# Patient Record
Sex: FEMALE | Race: ASIAN | NOT HISPANIC OR LATINO | URBAN - METROPOLITAN AREA
[De-identification: names, ages, dates, MRNs, and addresses within clinical notes are randomized per-mention and may not be internally consistent; named-entity substitution may affect disease eponyms.]

---
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
## 2021-04-01 ENCOUNTER — INPATIENT (INPATIENT)
Facility: HOSPITAL | Age: 21
LOS: 27 days | Discharge: HOME | End: 2021-04-29
Attending: PSYCHIATRY & NEUROLOGY | Admitting: PSYCHIATRY & NEUROLOGY
Payer: COMMERCIAL

## 2021-04-01 ENCOUNTER — OUTPATIENT (OUTPATIENT)
Dept: OUTPATIENT SERVICES | Facility: HOSPITAL | Age: 21
LOS: 1 days | Discharge: STILL ADMITTED OR ADMINISTRATIVE | End: 2021-04-01
Payer: COMMERCIAL

## 2021-04-01 VITALS
DIASTOLIC BLOOD PRESSURE: 93 MMHG | HEART RATE: 110 BPM | RESPIRATION RATE: 16 BRPM | OXYGEN SATURATION: 100 % | SYSTOLIC BLOOD PRESSURE: 149 MMHG | TEMPERATURE: 98 F | HEIGHT: 65 IN

## 2021-04-01 DIAGNOSIS — F31.9 BIPOLAR DISORDER, UNSPECIFIED: ICD-10-CM

## 2021-04-01 LAB
ALBUMIN SERPL ELPH-MCNC: 4.7 G/DL — SIGNIFICANT CHANGE UP (ref 3.3–5)
ALP SERPL-CCNC: 58 U/L — SIGNIFICANT CHANGE UP (ref 40–120)
ALT FLD-CCNC: 11 U/L — SIGNIFICANT CHANGE UP (ref 4–33)
ANION GAP SERPL CALC-SCNC: 16 MMOL/L — HIGH (ref 7–14)
APPEARANCE UR: CLEAR — SIGNIFICANT CHANGE UP
AST SERPL-CCNC: 21 U/L — SIGNIFICANT CHANGE UP (ref 4–32)
BASOPHILS # BLD AUTO: 0.02 K/UL — SIGNIFICANT CHANGE UP (ref 0–0.2)
BASOPHILS NFR BLD AUTO: 0.2 % — SIGNIFICANT CHANGE UP (ref 0–2)
BILIRUB SERPL-MCNC: 0.9 MG/DL — SIGNIFICANT CHANGE UP (ref 0.2–1.2)
BILIRUB UR-MCNC: NEGATIVE — SIGNIFICANT CHANGE UP
BUN SERPL-MCNC: 8 MG/DL — SIGNIFICANT CHANGE UP (ref 7–23)
CALCIUM SERPL-MCNC: 9.9 MG/DL — SIGNIFICANT CHANGE UP (ref 8.4–10.5)
CHLORIDE SERPL-SCNC: 103 MMOL/L — SIGNIFICANT CHANGE UP (ref 98–107)
CO2 SERPL-SCNC: 18 MMOL/L — LOW (ref 22–31)
COLOR SPEC: COLORLESS — SIGNIFICANT CHANGE UP
COVID-19 SPIKE DOMAIN AB INTERP: NEGATIVE — SIGNIFICANT CHANGE UP
COVID-19 SPIKE DOMAIN ANTIBODY RESULT: 0.4 U/ML — SIGNIFICANT CHANGE UP
CREAT SERPL-MCNC: 0.65 MG/DL — SIGNIFICANT CHANGE UP (ref 0.5–1.3)
DIFF PNL FLD: NEGATIVE — SIGNIFICANT CHANGE UP
EOSINOPHIL # BLD AUTO: 0.01 K/UL — SIGNIFICANT CHANGE UP (ref 0–0.5)
EOSINOPHIL NFR BLD AUTO: 0.1 % — SIGNIFICANT CHANGE UP (ref 0–6)
GLUCOSE SERPL-MCNC: 121 MG/DL — HIGH (ref 70–99)
GLUCOSE UR QL: NEGATIVE — SIGNIFICANT CHANGE UP
HCG SERPL-ACNC: <5 MIU/ML — SIGNIFICANT CHANGE UP
HCT VFR BLD CALC: 39.7 % — SIGNIFICANT CHANGE UP (ref 34.5–45)
HGB BLD-MCNC: 13.3 G/DL — SIGNIFICANT CHANGE UP (ref 11.5–15.5)
IANC: 6.06 K/UL — SIGNIFICANT CHANGE UP (ref 1.5–8.5)
IMM GRANULOCYTES NFR BLD AUTO: 0.3 % — SIGNIFICANT CHANGE UP (ref 0–1.5)
KETONES UR-MCNC: ABNORMAL
LEUKOCYTE ESTERASE UR-ACNC: NEGATIVE — SIGNIFICANT CHANGE UP
LYMPHOCYTES # BLD AUTO: 2.26 K/UL — SIGNIFICANT CHANGE UP (ref 1–3.3)
LYMPHOCYTES # BLD AUTO: 24.5 % — SIGNIFICANT CHANGE UP (ref 13–44)
MCHC RBC-ENTMCNC: 28.6 PG — SIGNIFICANT CHANGE UP (ref 27–34)
MCHC RBC-ENTMCNC: 33.5 GM/DL — SIGNIFICANT CHANGE UP (ref 32–36)
MCV RBC AUTO: 85.4 FL — SIGNIFICANT CHANGE UP (ref 80–100)
MONOCYTES # BLD AUTO: 0.85 K/UL — SIGNIFICANT CHANGE UP (ref 0–0.9)
MONOCYTES NFR BLD AUTO: 9.2 % — SIGNIFICANT CHANGE UP (ref 2–14)
NEUTROPHILS # BLD AUTO: 6.06 K/UL — SIGNIFICANT CHANGE UP (ref 1.8–7.4)
NEUTROPHILS NFR BLD AUTO: 65.7 % — SIGNIFICANT CHANGE UP (ref 43–77)
NITRITE UR-MCNC: NEGATIVE — SIGNIFICANT CHANGE UP
NRBC # BLD: 0 /100 WBCS — SIGNIFICANT CHANGE UP
NRBC # FLD: 0 K/UL — SIGNIFICANT CHANGE UP
PCP SPEC-MCNC: SIGNIFICANT CHANGE UP
PH UR: 6.5 — SIGNIFICANT CHANGE UP (ref 5–8)
PLATELET # BLD AUTO: 367 K/UL — SIGNIFICANT CHANGE UP (ref 150–400)
POTASSIUM SERPL-MCNC: 3.8 MMOL/L — SIGNIFICANT CHANGE UP (ref 3.5–5.3)
POTASSIUM SERPL-SCNC: 3.8 MMOL/L — SIGNIFICANT CHANGE UP (ref 3.5–5.3)
PROT SERPL-MCNC: 7.7 G/DL — SIGNIFICANT CHANGE UP (ref 6–8.3)
PROT UR-MCNC: NEGATIVE — SIGNIFICANT CHANGE UP
RBC # BLD: 4.65 M/UL — SIGNIFICANT CHANGE UP (ref 3.8–5.2)
RBC # FLD: 12.6 % — SIGNIFICANT CHANGE UP (ref 10.3–14.5)
SARS-COV-2 IGG+IGM SERPL QL IA: 0.4 U/ML — SIGNIFICANT CHANGE UP
SARS-COV-2 IGG+IGM SERPL QL IA: NEGATIVE — SIGNIFICANT CHANGE UP
SARS-COV-2 RNA SPEC QL NAA+PROBE: SIGNIFICANT CHANGE UP
SODIUM SERPL-SCNC: 137 MMOL/L — SIGNIFICANT CHANGE UP (ref 135–145)
SP GR SPEC: 1.01 — LOW (ref 1.01–1.02)
TOXICOLOGY SCREEN, DRUGS OF ABUSE, SERUM RESULT: SIGNIFICANT CHANGE UP
TSH SERPL-MCNC: 1.94 UIU/ML — SIGNIFICANT CHANGE UP (ref 0.27–4.2)
UROBILINOGEN FLD QL: SIGNIFICANT CHANGE UP
WBC # BLD: 9.23 K/UL — SIGNIFICANT CHANGE UP (ref 3.8–10.5)
WBC # FLD AUTO: 9.23 K/UL — SIGNIFICANT CHANGE UP (ref 3.8–10.5)

## 2021-04-01 PROCEDURE — 90853 GROUP PSYCHOTHERAPY: CPT

## 2021-04-01 PROCEDURE — 90839 PSYTX CRISIS INITIAL 60 MIN: CPT

## 2021-04-01 PROCEDURE — 99285 EMERGENCY DEPT VISIT HI MDM: CPT

## 2021-04-01 RX ORDER — HALOPERIDOL DECANOATE 100 MG/ML
5 INJECTION INTRAMUSCULAR EVERY 6 HOURS
Refills: 0 | Status: DISCONTINUED | OUTPATIENT
Start: 2021-04-01 | End: 2021-04-29

## 2021-04-01 RX ORDER — LITHIUM CARBONATE 300 MG/1
600 TABLET, EXTENDED RELEASE ORAL AT BEDTIME
Refills: 0 | Status: DISCONTINUED | OUTPATIENT
Start: 2021-04-01 | End: 2021-04-29

## 2021-04-01 RX ORDER — ARIPIPRAZOLE 15 MG/1
5 TABLET ORAL DAILY
Refills: 0 | Status: DISCONTINUED | OUTPATIENT
Start: 2021-04-01 | End: 2021-04-05

## 2021-04-01 RX ORDER — HALOPERIDOL DECANOATE 100 MG/ML
5 INJECTION INTRAMUSCULAR ONCE
Refills: 0 | Status: COMPLETED | OUTPATIENT
Start: 2021-04-01 | End: 2021-04-01

## 2021-04-01 RX ADMIN — Medication 2 MILLIGRAM(S): at 22:42

## 2021-04-01 RX ADMIN — HALOPERIDOL DECANOATE 5 MILLIGRAM(S): 100 INJECTION INTRAMUSCULAR at 22:41

## 2021-04-01 NOTE — ED PROVIDER NOTE - OBJECTIVE STATEMENT
20yo F hx prior psychiatric diagnoses of adjustment disorder and MDD with psychotic features referred from crisis center for hallucination. PT endorse she "makes up friend" "not to feel lonely" Endorsing auditory and visual hallucination, starts to laugh and giggle when asked. Denies command hallucination. Denies SI or HI. Denies taking an meds. ROS unremarkable. Denies ETOH or drug use.

## 2021-04-01 NOTE — ED ADULT TRIAGE NOTE - CHIEF COMPLAINT QUOTE
pt pmh bipolar affective disorder states that she was praying today and was using the holy spirit as her vessel. Pt states she experiences visual and auditory hallucinations. Pt is unable to explain what the hallucinations are. Denies SI/HI/drug/etoh use.

## 2021-04-01 NOTE — ED BEHAVIORAL HEALTH ASSESSMENT NOTE - OTHER
"ecstatic" not assessed Disheveled haircut CVM parents unpredictable appeared as stated age, gaunt stereotypical behavior; no catatonia "heavenly"

## 2021-04-01 NOTE — ED ADULT NURSE REASSESSMENT NOTE - NS ED NURSE REASSESS COMMENT FT1
Pt increasingly psychotic/disorganized; has intermittent screaming; poured pitcher of water over head stating "she is taking a shower", ripped bed linens off bed and was rolling around on the floor. Pt reassessed by NP; refuses PO meds and requests "a shot"; pt medicated as ordered.

## 2021-04-01 NOTE — ED BEHAVIORAL HEALTH ASSESSMENT NOTE - NSSUICPROTFACT_PSY_ALL_CORE
Identifies reasons for living/Supportive social network of family or friends/Engaged in work or school/Oriental orthodox beliefs

## 2021-04-01 NOTE — ED BEHAVIORAL HEALTH ASSESSMENT NOTE - SUMMARY
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 The patient has persistently elevated mood, increased goal-directed activities, and a decreased need for sleep consistent with a manic episode. She also has psychotic features including auditory hallucinations, delusions of persecution, reference, and grandiosity and thought broadcasting. She displays flight of ideas. The patient's pathology warrants inpatient hospitalization for safety and stabilization. 21/F with hx of Bipolar disorder, 2 prior [facility] admissions (last admission back in 8/2019 for management of acute anson), no prior hx of SA or self injurious behaviors and no substance abuse hx.  Today, presented to the [facility] parents due to worsening manic symptoms presenting as insomnia for the past 6 days; there is associated persistently elevated mood, lability, grandiosity, increased energy level, consistent with a manic episode.  Along with the anson, there is also has psychotic features including auditory hallucinations, delusions of persecution/ referential. 21/F with hx of Bipolar disorder, 2 prior [facility] admissions (last admission back in 8/2019 for management of acute anson), no prior hx of SA or self injurious behaviors and no substance abuse hx.  Today, presented to the [facility] parents due to worsening manic symptoms presenting as insomnia for the past 6 days; there is associated persistently elevated mood, lability, grandiosity, increased energy level, consistent with a manic episode.  Along with the anson, there is also has psychotic features including auditory hallucinations, delusions of persecution/ referential.    Overall, at this time, the Pt presents with disorganization in TP (FOI, illogical/ impaired reasoning), is severely affectively dysregulated, remains unpredictable, delusional, has poor insight and impaired judgement.  Pt's current symptoms have caused severe functional impairment.  She is unable to partake towards safety planning.  Collateral information was obtained from the parents who reported that the Pt has been increasingly manic for the last 1 week.  Pt has not been taking her psych meds.  Given Pt's current presentation, will need in-Pt psych admission for stabilization of mood/ psychosis    RECOMMENDATIONS:   1. REINITIATE Abilify 5mg daily as primary antipsychotic/ adjunct mood stabilizer and Lithium 600mg HS as primary mood stabilizer.  Primary treatment to titrate accordingly   2. PRN: Haldol 5mg PO/IM + Ativan 2mg PO/IM q6hrs PRN for psychotic agitation  3. do Lithium level in 5 days. adjust as appropriate   4. facilitate transfer to [facility] on 9.39 status once medically cleared

## 2021-04-01 NOTE — ED PROVIDER NOTE - PROGRESS NOTE DETAILS
GEORGE Hodgson NP: Patient endorsed from previous ED provider. Patient is calm, cooperative.  Labs reviewed, patient not in distress, nontoxic appearing and medically stable for inpatient psychiatric admission per the recommendation of ED psychiatry. Patient awaiting transport to [facility].  Patient increasingly disorganized, pouring water on herself, removing mattress from bed and screaming in room.  Patient offered PO medication but refused.  IM medication provided.

## 2021-04-01 NOTE — ED BEHAVIORAL HEALTH ASSESSMENT NOTE - RISK ASSESSMENT
Elevated risk of harm due to currently being manic. This risk will be mitigated by inpatient treatment. Protective factors are no current SI/HI. See  note for [place] suicide screen and other risk factors. Moderate Acute Suicide Risk RISK ASSESSMENT:   Modifiable risk factors: anson + psychosis  Unmodifiable risk factors: hx of bipolar disorder, Pt is doing poorly, past hx of psych hosps, treatment noncompliance  Protective factors: no hx of SA or any self injurious behaviors, Domiciled and in college,   good support from family, no access to lethal means, no objective evidence of suicidality, no complex medical issues or chronic pains, no hx of violence or any pending legal cases, not intoxicated or in withdrawal, no family hx of SA    At this time given all risks taken into consideration, the Pt is at acute suicide risk (given mood lability + psychosis) as well as remaining at chronically elevated risk of harming self.  Pt is deemed NOT dischargeable back to the community given this current presentation.  Current increased in risks can be mitigated by a psychiatric admission with supervision, continuing psych meds with titration towards efficacious dosing range

## 2021-04-01 NOTE — ED BEHAVIORAL HEALTH ASSESSMENT NOTE - DESCRIPTION
Single, no kids, will soon start college at [employer] Denies HTN, HLD, DM, or other illnesses NONE Single, only child. biological father living in [place]. Pt born in [place] and reports she migrated to [place] at age 9.  Mother remarried and current, Pt lives with mother and step fathger.  Step father is reportedly a Professor in Biology teaching at [employer]; mother is a soft ware .  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  She reports being interested in Languages.  Likes dancing, drawing, composing music.  she is not Episcopalian Since her  ED arrival, she was noted to be labile yet redirectable.  There has been no occurrence of agitation/aggressive behavior.  No verbalization of active/ passive SI/HI.   She is not showing any signs/symptoms of intoxication or withdrawal.  Pt is not delirious.  Has not tested limits.. Has maintained appropriate boundaries. Pt has been easily redirected.  Overall, there has been no management issues.     Vital Signs Last 24 Hrs  T(C): 36.7 (01 Apr 2021 10:48), Max: 36.7 (01 Apr 2021 10:48)  T(F): 98.1 (01 Apr 2021 10:48), Max: 98.1 (01 Apr 2021 10:48)  HR: 110 (01 Apr 2021 10:48) (110 - 110)  BP: 149/93 (01 Apr 2021 10:48) (149/93 - 149/93)  BP(mean): --  RR: 16 (01 Apr 2021 10:48) (16 - 16)  SpO2: 100% (01 Apr 2021 10:48) (100% - 100%)

## 2021-04-01 NOTE — ED BEHAVIORAL HEALTH ASSESSMENT NOTE - MEDICAL ISSUES AND PLAN (INCLUDE STANDING AND PRN MEDICATION)
No medical issues - Consider contraception given that the patient is being started on a mood stabilizer No medical issues

## 2021-04-01 NOTE — ED PROVIDER NOTE - PHYSICAL EXAMINATION
pt calm, cooperative in ED. smiling and giggling  to herself.   mmm. non icteric  normal S1-S2 slight tachycardia  talking in full sentences. poor inspiratory effort. no retractions.  thin female nontender abdomen.   ambulatory without assistance.   no pedal edema. no calf tenderness. normal pulses bilateral feet.  no lac of hands/arms

## 2021-04-01 NOTE — ED BEHAVIORAL HEALTH ASSESSMENT NOTE - PSYCHIATRIC ISSUES AND PLAN (INCLUDE STANDING AND PRN MEDICATION)
Would recommend beginning aripiprazole 5 mg daily, lithium 300 mg BID, and Klonopin 1 mg qhs. Can use Haldol 2.5 mg, Ativan 1 mg, benadryl 25 mg PO/IM q6h PRN for agitation. Would recommend restarting her back on Abilify 5mg daily and Lithium 600mg HS.  PRN: Haldol 5mg PO/IM + Ativan 2mg PO/IM q6hrs PRN for psychotic agitation

## 2021-04-01 NOTE — ED BEHAVIORAL HEALTH ASSESSMENT NOTE - HPI (INCLUDE ILLNESS QUALITY, SEVERITY, DURATION, TIMING, CONTEXT, MODIFYING FACTORS, ASSOCIATED SIGNS AND SYMPTOMS)
The Pt is 21 yr old  female, single, domiciled and full time college student (currently on remote module).  Has past hx of bipolar disorder, 2 prior [facility] admissions, hx of noncompliance to meds, no community psychiatrist but has an campus counsellor; no hx of SA or self injurious behaviors and no substance abuse hx.  Today, self presented to the ED BIB parents upon referral from our [facility] as Pt earlier presented to the [facility] for evaluation of anson.      She is seen bedside.  Pt is labile but redirectable.  Claims that she has been experiencing a hard time with her online classes.  She reports being "forced to learn".  Noted to intermittently cry then laugh.  Began to cry when she told about how her parents were making her feel bad with regards to assuming all school expenses (including parents paying up for her tuition fees).  Says that she was told by parents how they are "suffering for her".  Pt tells writer that with these statements made by her parents, she feels that "something is off".  Pt reports that parents are constantly lying to her about everything.  She believes that mother is "worshipping money over god".  Pt then switched topic and was noted to laugh as she says that today is April fools day.  She claims to see "birds laughing at us (her including this writer)."  She reports that the birds are communicating with her.. That they (the birds) are happy and excited.  She then made odd gestures during this evaluation,  expressing that she sends messages to the birds.   Apart from getting messages from birds, she also admits hearing the voice of god.  She claims to be getting Evangelical messages from god.  Pt believes that she is the "vessel of the holy spirit".  Thinks that she is "the chosen one" - however, also feels that "something is off".  Pt was unable to further elaborate on what this implied.  Apart from believing that she is "the chosen one", Pt is also convinced that "Carlos is ok but Sandra is not" (in reference to Carlos and Sandra - from the bible).      Pt says that "nothing is wrong with me".  Describes her mood as "heavenly".  Admits that she has not gotten enough sleep for 1 week now.  says that she does not feel tired at all.  "I feel perfectly fine", she says.  Reports that she is not taking any medications - "it messes me up".  Pt denied feeling depressed.  does not endorse to experiencing any symptoms suggestive of MDD/ vegetative symptoms.  does not harbor any passive/ active SI or HI. "superficially admitted" that she felt anxious but did not make any reference to any particular individual/ organization.  Denied experiencing any specific anxiety disorder symptoms.      COLLATERAL INFORMATION WAS OBTAINED FROM HER PARENTS: who both reported that the Pt after her last [facility] discharge (back in 8/2019), has not been taking any psych meds.  Parents both claim that the Pt has been agitated, labile and not sleeping for the past 6 days now.  She has also been reported to act "strangely", e.g. "hiding from parents" as she does not trust them.  IN addition, noted to be increasingly hyper Evangelical and "philosophical".  Father describes Pt as "very emotional" - crying often then laughing.  Stressors endorsed by parents include: Pt is stressed from school work and has been "trapped at home" - since the pandemic.  Pt has told them that she (the Pt) is feeling "perfectly fine".  No reported verbalization of SI or HI.  NO aggression/ violence. Today, due to worsening symptoms, parents decided to bring Pt to our [facility].      COLLATERAL FROM DR FRANCISCO JAVIER ALONSO: who saw Pt at our [facility].. noted to be disorganized, threw water whilst at the center.  Was too disorganized to partake fully in the evaluation.  He encouraged Pt, parents to come to our ED for further evaluation and management.

## 2021-04-01 NOTE — ED CLERICAL - NS ED CLERK NOTE PRE-ARRIVAL INFORMATION; ADDITIONAL PRE-ARRIVAL INFORMATION
Patient sent in from [facility] pt for manic psychotic episode.  Patient has PMH of Bipolar 1 with psychosis. Had psych admission in 8/2019. MD above requesting admission.

## 2021-04-01 NOTE — ED BEHAVIORAL HEALTH ASSESSMENT NOTE - AFFECT CONGRUENCE
01/30/20 1211   Wound Leg lower Medial;Right #1 01/23/20   Date First Assessed: 01/23/20   Present on Hospital Admission: No  Wound Approximate Age at First Assessment (Weeks): 1 weeks  Primary Wound Type: (c)   Location: Leg lower  Wound Location Orientation: Medial;Right  Wound Description: #1  Date of Firs. .. Dressing Status Removed   Dressing Type ABD pad;Gauze;Gauze wrap (kami); Special tape (comment); Xeroform   Non-staged Wound Description Full thickness   Wound Length (cm) 9 cm   Wound Width (cm) 4 cm   Wound Depth (cm) 0.1 cm   Wound Surface Area (cm^2) 36 cm^2   Wound Volume (cm^3) 3.6 cm^3   Condition of Base Pink;Slough   Condition of Edges Open   Drainage Amount Large   Drainage Color Serosanguinous   Wound Odor None   Christy-wound Assessment Intact   Cleansing and Cleansing Agents  Normal saline   Wound Toe (Comment  which one) Right;Medial #3 01/23/20   Date First Assessed/Time First Assessed: 01/23/20 0939   Wound Approximate Age at First Assessment (Weeks): 4 weeks  Primary Wound Type: (c) Friction  Location: (c) Toe (Comment  which one)  Wound Location Orientation: Right;Medial  Wound Description:. .. Dressing Status Removed   Dressing Type Adhesive wound dressing (Band-Aid)   Wound Length (cm) 1.5 cm   Wound Width (cm) 1.5 cm   Wound Depth (cm) 0.1 cm   Wound Surface Area (cm^2) 2.25 cm^2   Wound Volume (cm^3) 0.22 cm^3   Condition of Base Slough;Pink   Condition of Edges Open   Drainage Amount Moderate   Drainage Color Serous   Wound Odor None   Christy-wound Assessment Intact   Cleansing and Cleansing Agents  Normal saline   Wound Leg lower Left;Medial #5 01/23/20   Date First Assessed/Time First Assessed: 01/23/20 0944   Wound Approximate Age at First Assessment (Weeks): 1 weeks  Primary Wound Type: (c)   Location: Leg lower  Wound Location Orientation: Left;Medial  Wound Description: #5  Date of First Observati. ..    Dressing Status Removed   Dressing Type Xeroform;Gauze;ABD pad;Gauze wrap (kami); Special tape (comment)   Wound Length (cm) 11.6 cm   Wound Width (cm) 5.9 cm   Wound Depth (cm) 0.1 cm   Wound Surface Area (cm^2) 68.44 cm^2   Wound Volume (cm^3) 6.84 cm^3   Condition of Base Pink;Slough   Condition of Edges Open   Drainage Amount Large   Drainage Color Serosanguinous   Wound Odor None   Christy-wound Assessment Intact   Cleansing and Cleansing Agents  Normal saline   Wound Leg lower Right;Lateral #2 01/23/20   Date First Assessed/Time First Assessed: 01/23/20 0936   Wound Approximate Age at First Assessment (Weeks): 1 weeks  Primary Wound Type: Venous Ulcer  Location: Leg lower  Wound Location Orientation: Right;Lateral  Wound Description: #2  Date of First... Dressing Type Open to air   Wound Length (cm) 0.1 cm   Wound Width (cm) 0.1 cm   Wound Depth (cm) 0.1 cm   Wound Surface Area (cm^2) 0.01 cm^2   Wound Volume (cm^3) 0 cm^3   Wound Toe (Comment  which one) Right;Lateral #4 01/23/20   Date First Assessed/Time First Assessed: 01/23/20 0943   Wound Approximate Age at First Assessment (Weeks): 2 weeks  Primary Wound Type: Friction  Location: (c) Toe (Comment  which one)  Wound Location Orientation: Right;Lateral  Wound Description: #4. ..    Dressing Status Removed   Dressing Type Adhesive wound dressing (Band-Aid)   Wound Length (cm) 0.3 cm   Wound Width (cm) 0.3 cm   Wound Depth (cm) 0.3 cm   Wound Surface Area (cm^2) 0.09 cm^2   Wound Volume (cm^3) 0.03 cm^3   Condition of Base Granulation   Condition of Edges Open   Drainage Amount Moderate   Drainage Color Serous   Wound Odor None   Christy-wound Assessment Intact   Cleansing and Cleansing Agents  Normal saline     Visit Vitals  /79   Pulse 75   Temp 97.2 °F (36.2 °C)   Resp 18     LLE Peripheral Vascular   Capillary Refill: Less than/equal to 3 seconds (01/30/20 1154)  Color: Appropriate for race (01/30/20 1154)  Temperature: Warm (01/30/20 1154)  Sensation: Decreased (01/30/20 1154)  Pedal Pulse: Present (01/30/20 1154)  Circumference of Calf (cm): 40 cm (01/30/20 1154)  Location of Measurement (Calf): Mid  (01/30/20 1154)  Circumference of Ankle (cm): 24 cm (01/30/20 1154)  Location of Measurement (Ankle): Upper  (01/30/20 1154)  RLE Peripheral Vascular   Capillary Refill: Less than/equal to 3 seconds (01/30/20 1154)  Color: Appropriate for race (01/30/20 1154)  Temperature: Warm (01/30/20 1154)  Sensation: Decreased (01/30/20 1154)  Pedal Pulse: Present (01/30/20 1154)  Circumference of Calf (cm): 39.5 cm (01/30/20 1154)  Location of Measurement (Calf): Mid  (01/30/20 1154)  Circumference of Ankle (cm): 23.5 cm (01/30/20 1154)  Location of Measurement (Ankle): Upper  (01/30/20 1154)  2 nd dorsal toe 0.5 x 0.5 X 0.1 Congruent Not congruent

## 2021-04-01 NOTE — ED BEHAVIORAL HEALTH ASSESSMENT NOTE - OTHER PAST PSYCHIATRIC HISTORY (INCLUDE DETAILS REGARDING ONSET, COURSE OF ILLNESS, INPATIENT/OUTPATIENT TREATMENT)
hx of Bipolar disorder  Last admission at [facility] 1S (8/17-8/27/2019) for management of manic episode.      one other prior [facility] hospitalization (12/22-12/28/2017)   - past discharge diagnosis: adjustment disorder with depressed mood  - per chart review, Parents refused consent for SSRI during that admission  Pt followed up with therapist Ms Rosa Chowdhury; also saw a psychiatrist once at [facility] [place] soon after her [facility] discharge and was determined no psychotropics needed. Only recommended to start light and vitamin therapy during that encounter     no documented hx of SA

## 2021-04-01 NOTE — ED BEHAVIORAL HEALTH ASSESSMENT NOTE - DETAILS
Handoff provided to [facility] n/a plan discussed left extensive VM to Dr ONEAL Fall at ext [phone] discussed with ABIGAIL Hodgson; parents were updated no hx of SA or self injurious behaviors

## 2021-04-02 DIAGNOSIS — F31.2 BIPOLAR DISORDER, CURRENT EPISODE MANIC SEVERE WITH PSYCHOTIC FEATURES: ICD-10-CM

## 2021-04-02 PROBLEM — Z78.9 OTHER SPECIFIED HEALTH STATUS: Chronic | Status: ACTIVE | Noted: 2019-08-17

## 2021-04-02 LAB
CHOLEST SERPL-MCNC: 105 MG/DL — SIGNIFICANT CHANGE UP
HDLC SERPL-MCNC: 61 MG/DL — SIGNIFICANT CHANGE UP
LIPID PNL WITH DIRECT LDL SERPL: 36 MG/DL — SIGNIFICANT CHANGE UP
NON HDL CHOLESTEROL: 44 MG/DL — SIGNIFICANT CHANGE UP
TRIGL SERPL-MCNC: 38 MG/DL — SIGNIFICANT CHANGE UP

## 2021-04-02 PROCEDURE — 93010 ELECTROCARDIOGRAM REPORT: CPT

## 2021-04-02 PROCEDURE — 99222 1ST HOSP IP/OBS MODERATE 55: CPT

## 2021-04-02 RX ADMIN — LITHIUM CARBONATE 600 MILLIGRAM(S): 300 TABLET, EXTENDED RELEASE ORAL at 21:33

## 2021-04-02 NOTE — BH INPATIENT PSYCHIATRY ASSESSMENT NOTE - NSTXMANICGOAL_PSY_ALL_CORE
Be able to identify the early signs of anson (e.g. sleep and mood changes) and to employ coping strategies to minimize acting out

## 2021-04-02 NOTE — BH INPATIENT PSYCHIATRY ASSESSMENT NOTE - NSBHASSESSSUMMFT_PSY_ALL_CORE
Plan:  >Legal: 9.39  >Obs: Routine, no current SI. no need for CO, patient not expected to pose risk to self or others in controlled inpatient setting  >Psychiatric Meds: Restart outpatient medication regimen. Abilify 5mg and Lithium 600mg. Observe for tolerability and efficacy. Patient had been poorly adherent prior to admission.   >Labs: Admission labs reviewed, no acute findings.  >Medical:  No acute concerns. N  >Diet:Regular  >Social: milieu/structured therapy  >Treatment Interventions: Groups and Individual Therapy/CBT, Motivational counseling for substance abuse related issues.   >Dispo: Collateral and dispo planning pending further symptom and medication optimization. Transfer to [facility]

## 2021-04-02 NOTE — BH INPATIENT PSYCHIATRY ASSESSMENT NOTE - NSBHCHARTREVIEWVS_PSY_A_CORE FT
Vital Signs Last 24 Hrs  T(C): 36.3 (02 Apr 2021 00:22), Max: 36.7 (01 Apr 2021 10:48)  T(F): 97.3 (02 Apr 2021 00:22), Max: 98.1 (01 Apr 2021 10:48)  HR: 91 (02 Apr 2021 00:22) (91 - 110)  BP: 111/68 (02 Apr 2021 00:22) (102/62 - 149/93)  BP(mean): --  RR: 16 (02 Apr 2021 00:22) (16 - 16)  SpO2: 99% (02 Apr 2021 00:22) (98% - 100%)

## 2021-04-02 NOTE — BH INPATIENT PSYCHIATRY ASSESSMENT NOTE - NSBHMETABOLIC_PSY_ALL_CORE_FT
BMI: BMI (kg/m2): 18.4 (04-02-21 @ 00:22)  HbA1c:   Glucose:   BP: 111/68 (04-02-21 @ 00:22) (102/62 - 149/93)  Lipid Panel:

## 2021-04-02 NOTE — PSYCHIATRIC REHAB INITIAL EVALUATION - NSBHPRRECOMMEND_PSY_ALL_CORE
Psych rehab staff attempted to meet with patient several times in order to orient patient to psychiatric rehabilitation staff prior to  transfer to unit 1S, however writer was unsuccessful, therefore, the following information will be obtained from chart. Patient will be provided with a copy of unit schedule at a later time. Patient is currently admitted due to increased anson in the context of medication non-compliance. Patient has endorsed poor sleep, increased agitation, labile mood, religiously preoccupied thought content, and delusions.  Patient's psychiatric rehabilitation goal is to demonstrate medication compliance for seven consecutive days, as patient has a history of medication non-compliance. Psychiatric rehabilitation staff will engage patient daily.

## 2021-04-02 NOTE — BH INPATIENT PSYCHIATRY ASSESSMENT NOTE - HPI (INCLUDE ILLNESS QUALITY, SEVERITY, DURATION, TIMING, CONTEXT, MODIFYING FACTORS, ASSOCIATED SIGNS AND SYMPTOMS)
The Pt is 21 yr old  female, single, domiciled and full time college student (currently on remote module).  Has past hx of bipolar disorder, 2 prior [facility] admissions, hx of noncompliance to meds, no community psychiatrist but has an campus counsellor; no hx of SA or self injurious behaviors and no substance abuse hx.  Today, self presented to the ED BIB parents upon referral from our [facility] as Pt earlier presented to the [facility] for evaluation of anson.      She is seen bedside.  Pt is labile but redirectable.  Claims that she has been experiencing a hard time with her online classes.  She reports being "forced to learn".  Noted to intermittently cry then laugh.  Began to cry when she told about how her parents were making her feel bad with regards to assuming all school expenses (including parents paying up for her tuition fees).  Says that she was told by parents how they are "suffering for her".  Pt tells writer that with these statements made by her parents, she feels that "something is off".  Pt reports that parents are constantly lying to her about everything.  She believes that mother is "worshipping money over god".  Pt then switched topic and was noted to laugh as she says that today is April fools day.  She claims to see "birds laughing at us (her including this writer)."  She reports that the birds are communicating with her.. That they (the birds) are happy and excited.  She then made odd gestures during this evaluation,  expressing that she sends messages to the birds.   Apart from getting messages from birds, she also admits hearing the voice of god.  She claims to be getting Islam messages from god.  Pt believes that she is the "vessel of the holy spirit".  Thinks that she is "the chosen one" - however, also feels that "something is off".  Pt was unable to further elaborate on what this implied.  Apart from believing that she is "the chosen one", Pt is also convinced that "Carlos is ok but Sandra is not" (in reference to Carlos and Sandra - from the bible).      Pt says that "nothing is wrong with me".  Describes her mood as "heavenly".  Admits that she has not gotten enough sleep for 1 week now.  says that she does not feel tired at all.  "I feel perfectly fine", she says.  Reports that she is not taking any medications - "it messes me up".  Pt denied feeling depressed.  does not endorse to experiencing any symptoms suggestive of MDD/ vegetative symptoms.  does not harbor any passive/ active SI or HI. "superficially admitted" that she felt anxious but did not make any reference to any particular individual/ organization.  Denied experiencing any specific anxiety disorder symptoms.      COLLATERAL INFORMATION WAS OBTAINED FROM HER PARENTS: who both reported that the Pt after her last [facility] discharge (back in 8/2019), has not been taking any psych meds.  Parents both claim that the Pt has been agitated, labile and not sleeping for the past 6 days now.  She has also been reported to act "strangely", e.g. "hiding from parents" as she does not trust them.  IN addition, noted to be increasingly hyper Islam and "philosophical".  Father describes Pt as "very emotional" - crying often then laughing.  Stressors endorsed by parents include: Pt is stressed from school work and has been "trapped at home" - since the pandemic.  Pt has told them that she (the Pt) is feeling "perfectly fine".  No reported verbalization of SI or HI.  NO aggression/ violence. Today, due to worsening symptoms, parents decided to bring Pt to our [facility].      COLLATERAL FROM DR FRANCISCO JAVIER ALONSO: who saw Pt at our [facility].. noted to be disorganized, threw water whilst at the center.  Was too disorganized to partake fully in the evaluation.  He encouraged Pt, parents to come to our ED for further evaluation and management. Patient was seen and evaluated, chart reviewed. Case discussed with nursing team.  On service for this 21 yr old  female, single, domiciled and full time college student. Has past hx of Bipolar Disorder, with psychotic features. Patient is hospitalized with a primary problem of anson, psychotic decompensation in context of medication noncompliance.  Patient admitted to [facility] on 9.39 legal status. I have reviewed the initial psychiatric assessment in the electronic medical record, including the history of present illness, past psychiatric history, family/social history (no pertinent changes), and exam, and have confirmed the salient findings dated 4/1/21.    As per chart review, transferring records indicated the following:  The Pt is 21 yr old  female, single, domiciled and full time college student (currently on remote module).  Has past hx of bipolar disorder, 2 prior [facility] admissions, hx of noncompliance to meds, no community psychiatrist but has an campus counsellor; no hx of SA or self injurious behaviors and no substance abuse hx.  Today, self presented to the ED BIB parents upon referral from our [facility] as Pt earlier presented to the [facility] for evaluation of anson.    She is seen bedside.  Pt is labile but redirectable.  Claims that she has been experiencing a hard time with her online classes.  She reports being "forced to learn".  Noted to intermittently cry then laugh.  Began to cry when she told about how her parents were making her feel bad with regards to assuming all school expenses (including parents paying up for her tuition fees).  Says that she was told by parents how they are "suffering for her".  Pt tells writer that with these statements made by her parents, she feels that "something is off".  Pt reports that parents are constantly lying to her about everything.  She believes that mother is "worshipping money over god".  Pt then switched topic and was noted to laugh as she says that today is April fools day.  She claims to see "birds laughing at us (her including this writer)."  She reports that the birds are communicating with her.. That they (the birds) are happy and excited.  She then made odd gestures during this evaluation,  expressing that she sends messages to the birds.   Apart from getting messages from birds, she also admits hearing the voice of god.  She claims to be getting Cheondoism messages from god.  Pt believes that she is the "vessel of the holy spirit".  Thinks that she is "the chosen one" - however, also feels that "something is off".  Pt was unable to further elaborate on what this implied.  Apart from believing that she is "the chosen one", Pt is also convinced that "Carlos is ok but Sandra is not" (in reference to Carlos and Sandra - from the bible).   Pt says that "nothing is wrong with me".  Describes her mood as "heavenly".  Admits that she has not gotten enough sleep for 1 week now.  says that she does not feel tired at all.  "I feel perfectly fine", she says.  Reports that she is not taking any medications - "it messes me up".  Pt denied feeling depressed.  does not endorse to experiencing any symptoms suggestive of MDD/ vegetative symptoms.  does not harbor any passive/ active SI or HI. "superficially admitted" that she felt anxious but did not make any reference to any particular individual/ organization.  Denied experiencing any specific anxiety disorder symptoms.    COLLATERAL INFORMATION WAS OBTAINED FROM HER PARENTS: who both reported that the Pt after her last [facility] discharge (back in 8/2019), has not been taking any psych meds.  Parents both claim that the Pt has been agitated, labile and not sleeping for the past 6 days now.  She has also been reported to act "strangely", e.g. "hiding from parents" as she does not trust them.  IN addition, noted to be increasingly hyper Cheondoism and "philosophical".  Father describes Pt as "very emotional" - crying often then laughing.  Stressors endorsed by parents include: Pt is stressed from school work and has been "trapped at home" - since the pandemic.  Pt has told them that she (the Pt) is feeling "perfectly fine".  No reported verbalization of SI or HI.  NO aggression/ violence. Today, due to worsening symptoms, parents decided to bring Pt to our [facility].    COLLATERAL FROM DR FRANCISCO JAVIER ALONSO: who saw Pt at our [facility].. noted to be disorganized, threw water whilst at the center.  Was too disorganized to partake fully in the evaluation.  He encouraged Pt, parents to come to our ED for further evaluation and management.    On unit:  Information Received From: Chart review and patient interview  CC: “There’s nothing wrong with me "   Patient is followed up for Bipolar Disorder, with psychotic decompensation.  Chart, medications and labs reviewed.  Patient is discussed with nursing staff. No significant overnight issues.  Patient is observed in her room notably guarded upon approach, appears suspicious of writer, a bit hesitant to engage in interview. When questioned about her mood and psychotic symptoms, pt reports “there’s nothing wrong with me.”  Patient refused morning medications with Abilify 5mg, states “my mood is fine I don’t need medications.”   Patient presents profoundly disorganized, with ongoing perceptual disturbances, made references of birds talking to her. During interview patient smiles/laughs to herself.  Denies SI/HI, denies all psychotic features, despite psychotic symptoms successfully elicited. Patient is informed she will be transferred to [address] [place] [address].

## 2021-04-02 NOTE — BH INPATIENT PSYCHIATRY ASSESSMENT NOTE - OTHER PAST PSYCHIATRIC HISTORY (INCLUDE DETAILS REGARDING ONSET, COURSE OF ILLNESS, INPATIENT/OUTPATIENT TREATMENT)
hx of Bipolar disorder  Last admission at [facility] (8/17-8/27/2019) for management of manic episode.      one other prior [facility] hospitalization (12/22-12/28/2017)   - past discharge diagnosis: adjustment disorder with depressed mood  - per chart review, Parents refused consent for SSRI during that admission  Pt followed up with therapist Ms Rosa Chowdhury; also saw a psychiatrist once at [facility] soon after her [facility] discharge and was determined no psychotropics needed. Only recommended to start light and vitamin therapy during that encounter     no documented hx of SA

## 2021-04-02 NOTE — BH INPATIENT PSYCHIATRY ASSESSMENT NOTE - DESCRIPTION
Single, only child. biological father living in [place]. Pt born in [place] and reports she migrated to [place] at age 9.  Mother remarried and current, Pt lives with mother and step father.  Step father is reportedly a Professor in Biology teaching at [employer]; mother is a soft ware .  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  She reports being interested in Languages.  Likes dancing, drawing, composing music.  she is not Pentecostalism

## 2021-04-02 NOTE — BH INPATIENT PSYCHIATRY ASSESSMENT NOTE - CURRENT MEDICATION
MEDICATIONS  (STANDING):  ARIPiprazole 5 milliGRAM(s) Oral daily  lithium 600 milliGRAM(s) Oral at bedtime    MEDICATIONS  (PRN):  haloperidol     Tablet 5 milliGRAM(s) Oral every 6 hours PRN psychotic agitation  haloperidol    Injectable 5 milliGRAM(s) IntraMuscular every 6 hours PRN SEVERE PSYCHOTIC AGITATION  LORazepam     Tablet 2 milliGRAM(s) Oral every 6 hours PRN Agitation  LORazepam   Injectable 2 milliGRAM(s) IntraMuscular every 6 hours PRN SEVERE AGITATION

## 2021-04-02 NOTE — BH PATIENT PROFILE - BRADEN FRICTION AND SHEAR
pt BIBA after being found sleeping in front of liquor store. + ETOH. Pt denies drug use. Pt in NAD at this time. Changed into yellow gown with belongings secured
(3) no apparent problem

## 2021-04-02 NOTE — BH INPATIENT PSYCHIATRY ASSESSMENT NOTE - RISK ASSESSMENT
RISK ASSESSMENT:   Modifiable risk factors: anson + psychosis  Unmodifiable risk factors: hx of bipolar disorder, Pt is doing poorly, past hx of psych hosps, treatment noncompliance  Protective factors: no hx of SA or any self injurious behaviors, Domiciled and in college,   good support from family, no access to lethal means, no objective evidence of suicidality, no complex medical issues or chronic pains, no hx of violence or any pending legal cases, not intoxicated or in withdrawal, no family hx of SA    At this time given all risks taken into consideration, the Pt is at acute suicide risk (given mood lability + psychosis) as well as remaining at chronically elevated risk of harming self.  Pt is deemed NOT dischargeable back to the community given this current presentation.  Current increased in risks can be mitigated by a psychiatric admission with supervision, continuing psych meds with titration towards efficacious dosing range

## 2021-04-02 NOTE — BH INPATIENT PSYCHIATRY ASSESSMENT NOTE - NSSUICPROTFACT_PSY_ALL_CORE
Identifies reasons for living/Supportive social network of family or friends/Engaged in work or school/Adventist beliefs

## 2021-04-03 PROCEDURE — 99232 SBSQ HOSP IP/OBS MODERATE 35: CPT

## 2021-04-03 RX ADMIN — ARIPIPRAZOLE 5 MILLIGRAM(S): 15 TABLET ORAL at 08:57

## 2021-04-03 RX ADMIN — LITHIUM CARBONATE 600 MILLIGRAM(S): 300 TABLET, EXTENDED RELEASE ORAL at 21:08

## 2021-04-03 NOTE — BH INPATIENT PSYCHIATRY PROGRESS NOTE - MSE UNSTRUCTURED FT
Patient is casually dressed, animated, excited and eager to meet. + mild PMA, no abnormal movements. Speech is wnl. Mood "wonderful" and affect is elevated and expansive. TP tangential. Denies SI/HI/AVH, denies delusions. Cognition grossly intact. I&J poor.

## 2021-04-03 NOTE — BH INPATIENT PSYCHIATRY PROGRESS NOTE - NSBHFUPINTERVALHXFT_PSY_A_CORE
Patient seen for follow up of anson.  Case reviewed with comprehensive treatment team.  No acute events overnight.  Good behavioral control.  fair self-care.  Patient is compliant with medications with no reported or observed side effects.  Patient is eating and sleeping well.  Patient is engaged in treatment.  Patient reports feeling "great and wonderful".  She is unable to state why she needed to come to the hospital and states she does not want to return to living with her family.  She is not able to identify a reasonable alternative for housing after discharge.

## 2021-04-04 PROCEDURE — 99232 SBSQ HOSP IP/OBS MODERATE 35: CPT

## 2021-04-04 RX ADMIN — ARIPIPRAZOLE 5 MILLIGRAM(S): 15 TABLET ORAL at 09:50

## 2021-04-04 RX ADMIN — LITHIUM CARBONATE 600 MILLIGRAM(S): 300 TABLET, EXTENDED RELEASE ORAL at 21:24

## 2021-04-04 NOTE — BH INPATIENT PSYCHIATRY PROGRESS NOTE - NSBHFUPINTERVALHXFT_PSY_A_CORE
Patient seen for follow up of anson.  Case reviewed with comprehensive treatment team.  No acute events overnight.  Good behavioral control.  fair self-care.  Patient is compliant with medications with no reported or observed side effects.  Patient is eating and sleeping well.  Patient is engaged in treatment.  Patient reports feeling "very good".  She is unable to state why she needed to come to the hospital and states she does not want to return to living with her family.  She denies depressed mood.

## 2021-04-05 PROCEDURE — 99232 SBSQ HOSP IP/OBS MODERATE 35: CPT

## 2021-04-05 RX ORDER — ARIPIPRAZOLE 15 MG/1
10 TABLET ORAL DAILY
Refills: 0 | Status: DISCONTINUED | OUTPATIENT
Start: 2021-04-05 | End: 2021-04-08

## 2021-04-05 RX ORDER — LANOLIN ALCOHOL/MO/W.PET/CERES
3 CREAM (GRAM) TOPICAL AT BEDTIME
Refills: 0 | Status: DISCONTINUED | OUTPATIENT
Start: 2021-04-05 | End: 2021-04-29

## 2021-04-05 RX ADMIN — ARIPIPRAZOLE 5 MILLIGRAM(S): 15 TABLET ORAL at 08:22

## 2021-04-05 RX ADMIN — Medication 3 MILLIGRAM(S): at 23:57

## 2021-04-05 RX ADMIN — LITHIUM CARBONATE 600 MILLIGRAM(S): 300 TABLET, EXTENDED RELEASE ORAL at 21:27

## 2021-04-05 RX ADMIN — Medication 3 MILLIGRAM(S): at 03:25

## 2021-04-05 NOTE — BH INPATIENT PSYCHIATRY PROGRESS NOTE - NSBHFUPINTERVALHXFT_PSY_A_CORE
Chart reviewed and case discussed with treatment team.  No events reported over the weekend. Sleep is poor and appetite is fair. Patient is very pleasant and cooperative but has no insight into her illness or need for medication. She denies any AVH but appears internally preoccupied. Patient is compliant with medications, no adverse effects reported.

## 2021-04-05 NOTE — BH INPATIENT PSYCHIATRY PROGRESS NOTE - NSBHASSESSSUMMFT_PSY_ALL_CORE
Patient is complaint with medications, still disorganized with poor sleep.    Titrate Abilify and c/w Lithium

## 2021-04-05 NOTE — BH SOCIAL WORK INITIAL PSYCHOSOCIAL EVALUATION - NSPTSTATEDGOAL_PSY_ALL_CORE
Patient stated that she is thinking of dropping her major and pursuing the arts. May take a year off to find more interests.

## 2021-04-06 PROCEDURE — 99232 SBSQ HOSP IP/OBS MODERATE 35: CPT

## 2021-04-06 PROCEDURE — 90853 GROUP PSYCHOTHERAPY: CPT

## 2021-04-06 RX ADMIN — Medication 0.5 MILLIGRAM(S): at 21:30

## 2021-04-06 RX ADMIN — LITHIUM CARBONATE 600 MILLIGRAM(S): 300 TABLET, EXTENDED RELEASE ORAL at 21:30

## 2021-04-06 RX ADMIN — ARIPIPRAZOLE 10 MILLIGRAM(S): 15 TABLET ORAL at 09:13

## 2021-04-06 NOTE — BH INPATIENT PSYCHIATRY PROGRESS NOTE - NSBHFUPINTERVALHXFT_PSY_A_CORE
Care discussed with treatment team. Patient is oddly related, disheveled, reported feeling anxious, tense. Patient needs supervision and redirection from staff at times. Patient denied racing thoughts, and speech is observed to be with slow rate, monotonous. Patient reported that she was admitted due to "difficulty calming down". Patient with fair sleep and appetite.

## 2021-04-06 NOTE — DIETITIAN INITIAL EVALUATION ADULT. - OTHER INFO
Seen Pt in day room. Reports fair appetite/po intake at present. No GI distress noted. Describes herself as a "picky eater".  Follows a Regular diet. Offered alternate menu choices. Pt agreed. Food preferences taken and implemented. Encouraged po intake, Pt verbalized understanding.

## 2021-04-06 NOTE — BH INPATIENT PSYCHIATRY PROGRESS NOTE - NSBHASSESSSUMMFT_PSY_ALL_CORE
21 yr old  female, single, domiciled and full time college student at [employer] (currently on remote module).  Has past psych hx of bipolar disorder, 2 prior [facility] admissions, hx of noncompliance to meds, no community psychiatrist but has an [place] counsellor; no hx of SA or self injurious behaviors and no substance abuse hx. presented to the ED BIB parents upon referral from our [facility] as Pt earlier presented to the [facility] for evaluation of anson.    Patient is complaint with medications, still disorganized, oddly related, needing redirection and supervision from staff.   c/w Abilify and lithium, adding ativan 0.5mg bid

## 2021-04-07 LAB
ANION GAP SERPL CALC-SCNC: 9 MMOL/L — SIGNIFICANT CHANGE UP (ref 7–14)
BUN SERPL-MCNC: 8 MG/DL — SIGNIFICANT CHANGE UP (ref 7–23)
CALCIUM SERPL-MCNC: 9.8 MG/DL — SIGNIFICANT CHANGE UP (ref 8.4–10.5)
CHLORIDE SERPL-SCNC: 105 MMOL/L — SIGNIFICANT CHANGE UP (ref 98–107)
CO2 SERPL-SCNC: 25 MMOL/L — SIGNIFICANT CHANGE UP (ref 22–31)
CREAT SERPL-MCNC: 0.73 MG/DL — SIGNIFICANT CHANGE UP (ref 0.5–1.3)
GLUCOSE SERPL-MCNC: 98 MG/DL — SIGNIFICANT CHANGE UP (ref 70–99)
LITHIUM SERPL-MCNC: 0.7 MMOL/L — SIGNIFICANT CHANGE UP (ref 0.6–1.2)
POTASSIUM SERPL-MCNC: 3.8 MMOL/L — SIGNIFICANT CHANGE UP (ref 3.5–5.3)
POTASSIUM SERPL-SCNC: 3.8 MMOL/L — SIGNIFICANT CHANGE UP (ref 3.5–5.3)
SODIUM SERPL-SCNC: 139 MMOL/L — SIGNIFICANT CHANGE UP (ref 135–145)

## 2021-04-07 PROCEDURE — 99232 SBSQ HOSP IP/OBS MODERATE 35: CPT | Mod: 25

## 2021-04-07 RX ADMIN — ARIPIPRAZOLE 10 MILLIGRAM(S): 15 TABLET ORAL at 08:39

## 2021-04-07 RX ADMIN — Medication 0.5 MILLIGRAM(S): at 22:19

## 2021-04-07 RX ADMIN — Medication 0.5 MILLIGRAM(S): at 09:08

## 2021-04-07 RX ADMIN — LITHIUM CARBONATE 600 MILLIGRAM(S): 300 TABLET, EXTENDED RELEASE ORAL at 22:19

## 2021-04-07 NOTE — BH INPATIENT PSYCHIATRY PROGRESS NOTE - NSBHASSESSSUMMFT_PSY_ALL_CORE
21 yr old  female, single, domiciled and full time college student at [employer] (currently on remote module).  Has past psych hx of bipolar disorder, 2 prior [facility] admissions, hx of noncompliance to meds, no community psychiatrist but has an [place] counsellor; no hx of SA or self injurious behaviors and no substance abuse hx. presented to the ED BIB parents upon referral from our [facility] as Pt earlier presented to the [facility] for evaluation of anson.    Patient is complaint with medications, still disorganized, oddly related, needing redirection and supervision from staff.   c/w Abilify and lithium, adding ativan 0.5mg bid  21 yr old  female, single, domiciled and full time college student at [employer] (currently on remote module).  Has past psych hx of bipolar disorder, 2 prior [facility] admissions, hx of noncompliance to meds, no community psychiatrist but has an [place] counsellor; no hx of SA or self injurious behaviors and no substance abuse hx. presented to the ED BIB parents upon referral from our [facility] as Pt earlier presented to the [facility] for evaluation of anson.    Patient is complaint with medications, still disorganized, oddly related, needing redirection and supervision from staff.   c/w Abilify and lithium, adding ativan 0.5mg bid   2/17: lithium level at 0.7, BMP wnl

## 2021-04-07 NOTE — BH INPATIENT PSYCHIATRY PROGRESS NOTE - NSBHFUPINTERVALHXFT_PSY_A_CORE
Care discussed with treatment team. Patient reported feeling "good, not anxious", was smiling appropriately, was able to verbalize reason for admission. Patient reported that she could have "come off as hyperactive" on admission. Patient still remaining psychomotor retarded, some improvement today. Patient  Care discussed with treatment team. Patient reported feeling "good, not anxious", was smiling appropriately, was able to verbalize reason for admission. Patient reported that she could have "come off as hyperactive" on admission. Patient still remaining psychomotor retarded, some improvement today. Patient also was able to verbalize that she was on Lithium in the past, self d/c due to feeling better and thinking she did not need it. Patient reported that she does not have a psychiatrist at present. Reported sleeping better and with fair appetite.

## 2021-04-08 PROCEDURE — 99232 SBSQ HOSP IP/OBS MODERATE 35: CPT

## 2021-04-08 RX ORDER — ARIPIPRAZOLE 15 MG/1
15 TABLET ORAL DAILY
Refills: 0 | Status: DISCONTINUED | OUTPATIENT
Start: 2021-04-09 | End: 2021-04-12

## 2021-04-08 RX ADMIN — ARIPIPRAZOLE 10 MILLIGRAM(S): 15 TABLET ORAL at 08:56

## 2021-04-08 RX ADMIN — LITHIUM CARBONATE 600 MILLIGRAM(S): 300 TABLET, EXTENDED RELEASE ORAL at 21:09

## 2021-04-08 RX ADMIN — Medication 0.5 MILLIGRAM(S): at 21:09

## 2021-04-08 NOTE — BH INPATIENT PSYCHIATRY PROGRESS NOTE - NSBHMSESPEECH_PSY_A_CORE
Abnormal as indicated, otherwise normal...

## 2021-04-08 NOTE — BH INPATIENT PSYCHIATRY PROGRESS NOTE - NSBHFUPINTERVALHXFT_PSY_A_CORE
Care discussed with treatment team. Patient reported feeling "good, little tired", Patient slightly psychomotor retarded. Reported sleeping better and with fair appetite. Patient was observed on the unit during group time, and patient reported that she found difficulty concentrating in groups. Patient was disorganized while trying to explain reason for her non-adherence to treatment and if she had a provider prior to hospitalization.

## 2021-04-08 NOTE — BH INPATIENT PSYCHIATRY PROGRESS NOTE - NSBHASSESSSUMMFT_PSY_ALL_CORE
21 yr old  female, single, domiciled and full time college student at [employer] (currently on remote module).  Has past psych hx of bipolar disorder, 2 prior [facility] admissions, hx of noncompliance to meds, no community psychiatrist but has an [place] counsellor; no hx of SA or self injurious behaviors and no substance abuse hx. presented to the ED BIB parents upon referral from our [facility] as Pt earlier presented to the [facility] for evaluation of anson.    Patient is complaint with medications, still disorganized, oddly related, needing redirection and supervision from staff.   increase Abilify to 15mg, c/w  lithium,  ativan 0.5mg bid   2/17: lithium level at 0.7, BMP wnl

## 2021-04-08 NOTE — BH INPATIENT PSYCHIATRY PROGRESS NOTE - NSBHPSYCHOLCOGORIENT_PSY_A_CORE
Oriented to time, place, person, situation

## 2021-04-09 PROCEDURE — 99232 SBSQ HOSP IP/OBS MODERATE 35: CPT

## 2021-04-09 RX ADMIN — LITHIUM CARBONATE 600 MILLIGRAM(S): 300 TABLET, EXTENDED RELEASE ORAL at 21:10

## 2021-04-09 RX ADMIN — ARIPIPRAZOLE 15 MILLIGRAM(S): 15 TABLET ORAL at 08:43

## 2021-04-09 RX ADMIN — Medication 1 MILLIGRAM(S): at 21:10

## 2021-04-09 NOTE — BH INPATIENT PSYCHIATRY PROGRESS NOTE - NSBHASSESSSUMMFT_PSY_ALL_CORE
21 yr old  female, single, domiciled and full time college student at [employer] (currently on remote module).  Has past psych hx of bipolar disorder, 2 prior [facility] admissions, hx of noncompliance to meds, and no community psychiatrist but has an campus counsellor; no hx of SA or self-injurious behaviors and no substance abuse hx. Presented to the ED BIB parents upon referral from our [facility] as Pt earlier presented to the [facility] for evaluation of anson.  Patient is complaint with medications, except Ativan still illogical, oddly related, at times needing redirection and supervision from staff. Insight and judgment poor.   C/w Abilify 15mg, lithium 600mg, and change to Ativan 1mg to HS

## 2021-04-09 NOTE — BH INPATIENT PSYCHIATRY PROGRESS NOTE - NSBHFUPINTERVALHXFT_PSY_A_CORE
Chart reviewed, case discussed with treatment team. Patient reported feeling "I am good, little tired". Psychomotor retardation noticed. Reported sleeping better and with fair appetite. Patient was observed on the unit with peers doing activities. Patient reported attending groups, able to focus better.  Patient denies suicidal thoughts, plan or intent. Denies self-injurious thoughts. Denies A/V hallucination. Continue to be defensive and not understanding the need for adherence to treatment, and follow up with outpatient psychiatrist after discharge.   Morning VSS as per flow. Continue to monitor. No acute events over night, no PRN’s last 24 hours. Patient refused tab Ativan 0.5mg 0900AM, and requesting to change Ativan only at night.

## 2021-04-09 NOTE — BH INPATIENT PSYCHIATRY PROGRESS NOTE - MSE UNSTRUCTURED FT
Patient appears stated age, fair hygiene, dressed appropriately. She was calm, cooperative with the interview. Maintained appropriate eye contact. Psychomotor retardation noted, no psychomotor agitation.  Steady gait. The patient’s speech was monotones, slow rate, and normal volume. The patient’s stated mood is “I am good, little tired.” Affect is depressed, constricted, and congruent. Denies suicidal or homicidal ideation, plan or intention. Denies self-injurious ideation. Denies A/V hallucination. Thought process illogical. Insight is poor. Judgment is poor Impulse control has been fair on the unit. Cognition: Grossly intact, alert/oriented in all spheres.

## 2021-04-10 RX ADMIN — ARIPIPRAZOLE 15 MILLIGRAM(S): 15 TABLET ORAL at 09:12

## 2021-04-10 RX ADMIN — Medication 1 MILLIGRAM(S): at 21:15

## 2021-04-10 RX ADMIN — LITHIUM CARBONATE 600 MILLIGRAM(S): 300 TABLET, EXTENDED RELEASE ORAL at 21:15

## 2021-04-11 RX ADMIN — Medication 1 MILLIGRAM(S): at 21:07

## 2021-04-11 RX ADMIN — LITHIUM CARBONATE 600 MILLIGRAM(S): 300 TABLET, EXTENDED RELEASE ORAL at 21:07

## 2021-04-11 RX ADMIN — ARIPIPRAZOLE 15 MILLIGRAM(S): 15 TABLET ORAL at 11:20

## 2021-04-12 PROCEDURE — 99232 SBSQ HOSP IP/OBS MODERATE 35: CPT | Mod: 25

## 2021-04-12 RX ORDER — ARIPIPRAZOLE 15 MG/1
10 TABLET ORAL DAILY
Refills: 0 | Status: DISCONTINUED | OUTPATIENT
Start: 2021-04-12 | End: 2021-04-16

## 2021-04-12 RX ADMIN — Medication 1 MILLIGRAM(S): at 21:35

## 2021-04-12 RX ADMIN — LITHIUM CARBONATE 600 MILLIGRAM(S): 300 TABLET, EXTENDED RELEASE ORAL at 21:33

## 2021-04-12 RX ADMIN — ARIPIPRAZOLE 15 MILLIGRAM(S): 15 TABLET ORAL at 09:04

## 2021-04-12 NOTE — BH INPATIENT PSYCHIATRY PROGRESS NOTE - NSBHFUPINTERVALHXFT_PSY_A_CORE
Chart reviewed. Case discussed with interdisciplinary team. Patient seen and examined for follow up of anson. No acute overnight events. Per report, remains odd and bizarre, but has been in good behavioral control, less disorganized, and less intrusive. Appears very stiff, but no reported cogwheeling last week. Compliant with standing medication. No PRNs required or requested. Per sleep log, fair sleep.     Patient seen this morning by writer and attending. In discussing events leading up to admission, patient reports several stressors prior to her hospitalization, including living with her parents, which she describes as "rough," and academic challenges after switching to a remote [employer] curriculum at [place], resulting in poor academic performance. Pt states that her parents worry, which caused her to feel worry, resulting in lack of sleep. She states that per her parents, she did not sleep for 5 consecutive nights. She states she felt "fine," except that "her parents felt worried." She reports that in the ED, she was "hyper," and admits to "feeling something spiritual." She endorses currently feeling "conscious of the holy spirit." She denies ideas of reference, auditory or visual hallucinations, or paranoia. When asked about her reported paranoia toward her parents upon admission, she denies current paranoia toward her parents. She shares that her mother made hurtful comments to her prior to admission, such as that she reportedly feels guilty for having given birth to her. She states that prior to admission she stopped taking her medication because she felt that she no longer needed it. She admits to discussing this with her psychiatrist, Dr. Tolliver, at [place], who reportedly advised her to do so with caution and provided her with a list of resources.     Today, she reports her mood is "good." She endorses good appetite and PO intake. Sleeping well at night, but feels sleepy during the day. Denies SI/thoughts of self-harm. She reports feeling more stiff. Chart reviewed. Case discussed with interdisciplinary team. Patient seen and examined for follow up of anson. No acute overnight events. Per report, remains odd and bizarre, but has been in good behavioral control, less disorganized, and less intrusive. Appears very stiff, but no reported cogwheeling last week. Compliant with standing medication. No PRNs required or requested. Per sleep log, fair sleep.     Patient seen this morning by writer and attending. In discussing events leading up to admission, patient reports several stressors prior to her hospitalization, including living with her parents, which she describes as "rough," and academic challenges after switching to a remote college curriculum at [employer], resulting in poor academic performance. Pt states that her parents worry, which caused her to feel worry, resulting in lack of sleep. She states that per her parents, she did not sleep for 5 consecutive nights. She states she felt "fine," except that "her parents felt worried." She reports that in the ED, she was "hyper," and admits to "feeling something spiritual." She endorses currently feeling "conscious of the holy spirit." She denies ideas of reference, auditory or visual hallucinations, or paranoia. When asked about her reported paranoia toward her parents upon admission, she denies current paranoia toward her parents. She shares that her mother made hurtful comments to her prior to admission, such as that she reportedly feels guilty for having given birth to her. She states that prior to admission she stopped taking her medication because she felt that she no longer needed it. She admits to discussing this with her psychiatrist, Dr. Tolliver, at [place], who reportedly advised her to do so with caution and provided her with a list of resources.     Today, she reports her mood is "good." She endorses good appetite and PO intake. Sleeping well at night, but feels sleepy during the day. Denies SI/thoughts of self-harm. She reports feeling more stiff.      In ED, pt started on Abilify, titrated to 15 mg, Lithium 600 mg QHS, and Ativan 1 mg QHS. Per chart review, on last admission patient had been stabilized and discharged on Lithium carbonate 600 qhs, Abilify 5 mg daily, and Clonazepam 1 mg qhs and did not have parkinsonism features at this time.    Chart reviewed. Case discussed with interdisciplinary team. Patient seen and examined for follow up of anson and psychosis. No acute overnight events. Per report, remains odd and bizarre, but has been in good behavioral control, less disorganized, and less intrusive. Appears very stiff, but no reported cogwheeling last week. Compliant with standing medication. No PRNs required or requested. Per sleep log, fair sleep.     Patient seen this morning by writer and attending. In discussing events leading up to admission, patient reports several stressors prior to her hospitalization, including living with her parents, which she describes as "rough," and academic challenges after switching to a remote college curriculum at [place], resulting in poor academic performance. Pt states that her parents worry, which caused her to feel worry, resulting in lack of sleep. She states that per her parents, she did not sleep for 5 consecutive nights. She states she felt "fine," except that "her parents felt worried." She reports that in the ED, she was "hyper," and admits to "feeling something spiritual." She endorses currently feeling "conscious of the holy spirit." She denies ideas of reference, auditory or visual hallucinations, or paranoia. When asked about her reported paranoia toward her parents upon admission, she denies current paranoia toward her parents. She shares that her mother made hurtful comments to her prior to admission, such as that she reportedly feels guilty for having given birth to her. She states that prior to admission she stopped taking her medication because she felt that she no longer needed it. She admits to discussing this with her psychiatrist, Dr. Tolliver, at [place], who reportedly advised her to do so with caution and provided her with a list of resources.     Today, she reports her mood is "good." She endorses good appetite and PO intake. Sleeping well at night, but feels sleepy during the day. Denies SI/thoughts of self-harm. She reports feeling more stiff.    Of note: In ED, pt started on Abilify, titrated to 15 mg, Lithium 600 mg QHS, and Ativan 1 mg QHS. Per chart review, on last admission patient had been stabilized and discharged on Lithium carbonate 600 qhs, Abilify 5 mg daily, and Clonazepam 1 mg qhs and did not have parkinsonism features at this time.

## 2021-04-12 NOTE — BH INPATIENT PSYCHIATRY PROGRESS NOTE - NSBHASSESSSUMMFT_PSY_ALL_CORE
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  Has past psych hx of bipolar disorder, 2 prior [facility] admissions, hx of noncompliance to meds, and no community psychiatrist but has an campus counsellor; no hx of SA or self-injurious behaviors and no substance abuse hx. Presented to the ED BIB parents upon referral from our [facility] as Pt earlier presented to the [facility] for evaluation of anson.  Patient is complaint with medications, except Ativan still illogical, oddly related, at times needing redirection and supervision from staff. Insight and judgment poor.   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    Working diagnosis: Bipolar 1 Disorder, manic episode with psychotic features    Currently, patient in good behavioral control, reports good mood, no longer manic, or overtly psychotic; however she appears slow, postured, with masked facies and fine bilat hand tremor; no cogwheel rigidity on PE. Her presentation is concerning for antipsychotic-induced parkinsonism 2/2 Abilify.     Continued inpatient admission required for stabilization, medication optimization, safe discharge planning.      PLAN:  1. Legal: Admitted on 9.39 status  2. Safety: No reported SI/SIB/HI/VI currently on unit; continue routine observation.   -psychotropic PRN medications: haldol 5mg q6 PRN psychotic agitation; lorazepam 2 mg PO q6 hr PRN agitation; melatonin 3 mg PO QHS PRN insomnia  3. Psychiatric:   - Dec Abilify to 10 mg due to concern for antipsychotic-induced parkinsonism (dec on 4/12); prescribed for psychosis.   - Continue Lithium 600 mg PO QHS for bipolar d/o. Last lithium level was 0.7 on 4/07/21.  - Continue Ativan 1 mg PO QHS for anson/insomnia.   4. Therapy: group & milieu therapy  5. Medical: No acute medical needs identified  6. Collateral: No new collateral.  7. Disposition: When stable, outpatient services. D/w SW.    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    Working diagnosis: Bipolar 1 Disorder, manic episode with psychotic features    Currently, patient in good behavioral control, reports good mood, with improving anson and reduction in eran psychotic symptoms; however she appears slow, postured, with masked facies and fine bilat hand tremor; no cogwheel rigidity on PE. Her presentation is concerning for antipsychotic-induced parkinsonism 2/2 Abilify.     Continued inpatient admission required for stabilization, medication optimization, safe discharge planning.      PLAN:  1. Legal: Admitted on 9.39 status  2. Safety: No reported SI/SIB/HI/VI currently on unit; continue routine observation.   -psychotropic PRN medications: haldol 5mg q6 PRN psychotic agitation; lorazepam 2 mg PO q6 hr PRN agitation; melatonin 3 mg PO QHS PRN insomnia  3. Psychiatric:   - Dec Abilify to 10 mg due to concern for antipsychotic-induced parkinsonism (dec on 4/12); prescribed for psychosis.   - Continue Lithium 600 mg PO QHS for bipolar anson. Last lithium level was 0.7 on 4/07/21.  - Continue Ativan 1 mg PO QHS for anson/insomnia.   4. Therapy: group & milieu therapy  5. Medical: No acute medical needs identified  6. Collateral: No new collateral. Expand from family if patient willing.   7. Disposition: When stable, outpatient services. D/w STANLEY.

## 2021-04-12 NOTE — BH INPATIENT PSYCHIATRY PROGRESS NOTE - CASE SUMMARY
Brielle is a 22 yo F, known to this MD from prior hospitalization, with PPHx of BPAD, medication non-compliance, who presented now BIB family for anson and psychosis. Pt reports and evidences improvements in AH, paranoia and delusions, as well as sleep and mood lability on current regimen. However reporting day time sedation and p/w signs c/f drug induced parkinsonism. Decrease abilify to 10 mg and continue to monitor. Continue other medications, G/M therapy.

## 2021-04-12 NOTE — BH INPATIENT PSYCHIATRY PROGRESS NOTE - MSE UNSTRUCTURED FT
MSE:  MAGGIE/BEH: Adult female in no acute distress; dressed appropriately; calm & cooperative; fair eye contact.   SPEECH: Slow rate and volume, monotone.   MOTOR: (+) psychomotor retardation; appears postured, with masked facies; slight resting tremor in hands; no other abnormal movements.   MOOD: “good”.   AFFECT: neutral, and mood congruent; flat range.   THOUGHT PROCESS: Linear, logical, and goal-directed.   THOUGHT CONTENT: Denies SI/HI; denies AVH; no evidence of delusions.   COGNITION: Grossly intact.   INSIGHT: poor.   JUDGMENT: poor. MSE:  MAGGIE/BEH: Adult female in no acute distress; dressed appropriately; calm & cooperative; fair eye contact and relatedness  SPEECH: Slow rate and volume, monotone.   MOTOR: (+) psychomotor retardation; appears postured, with masked facies; slight resting tremor in hands; no other abnormal movements including TD.   MOOD: “good”.   AFFECT: neutral, and mood congruent; flat range.   THOUGHT PROCESS: Linear, logical, and goal-directed.   THOUGHT CONTENT: Focused on sxs leading to admission. Denies SI/HI; denies AVH; no evidence of delusions.   COGNITION: Grossly intact.   INSIGHT: poor.   JUDGMENT: poor.

## 2021-04-13 PROCEDURE — 99232 SBSQ HOSP IP/OBS MODERATE 35: CPT

## 2021-04-13 PROCEDURE — 90853 GROUP PSYCHOTHERAPY: CPT

## 2021-04-13 RX ORDER — BENZTROPINE MESYLATE 1 MG
1 TABLET ORAL
Refills: 0 | Status: DISCONTINUED | OUTPATIENT
Start: 2021-04-13 | End: 2021-04-22

## 2021-04-13 RX ADMIN — LITHIUM CARBONATE 600 MILLIGRAM(S): 300 TABLET, EXTENDED RELEASE ORAL at 21:22

## 2021-04-13 RX ADMIN — ARIPIPRAZOLE 10 MILLIGRAM(S): 15 TABLET ORAL at 09:25

## 2021-04-13 RX ADMIN — Medication 1 MILLIGRAM(S): at 21:22

## 2021-04-13 NOTE — BH INPATIENT PSYCHIATRY PROGRESS NOTE - MSE UNSTRUCTURED FT
MSE:  MAGGIE/BEH: Adult female in no acute distress; dressed appropriately; calm & cooperative; fair eye contact and relatedness  SPEECH: Slow rate and volume, monotone.   MOTOR: (+) psychomotor retardation; appears postured, with masked facies; slight resting tremor in hands; no other abnormal movements including TD. Rigidity in b/l LE but not lead pipe and no cog-wheeling,  MOOD: “good”.   AFFECT: neutral, and mood congruent; flat range.   THOUGHT PROCESS: Linear, logical, and goal-directed.   THOUGHT CONTENT: Focused on current physical symptoms and treatment plan. Denies SI/HI; denies AVH; no evidence of delusions but some odd content about body language  COGNITION: Grossly intact.   INSIGHT: poor.   JUDGMENT: poor.  IC: intact

## 2021-04-13 NOTE — BH INPATIENT PSYCHIATRY PROGRESS NOTE - NSBHASSESSSUMMFT_PSY_ALL_CORE
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    Working diagnosis: Bipolar 1 Disorder, manic episode with psychotic features    Currently, patient in good behavioral control, reports good mood, with improving anson and reduction in eran psychotic symptoms; however she appears slow, postured, with masked facies and fine bilat hand tremor; no cogwheel rigidity on PE but rigidity in b/l LE noted. Her presentation is concerning for antipsychotic-induced parkinsonism 2/2 Abilify and akathesia.    Continued inpatient admission required for stabilization, medication optimization, safe discharge planning.      PLAN:  1. Legal: Admitted on 9.39 status  2. Safety: No reported SI/SIB/HI/VI currently on unit; continue routine observation.   -psychotropic PRN medications: haldol 5mg q6 PRN psychotic agitation; lorazepam 2 mg PO q6 hr PRN agitation; melatonin 3 mg PO QHS PRN insomnia  3. Psychiatric:   - Dec Abilify to 10 mg due to concern for antipsychotic-induced parkinsonism (dec on 4/12); prescribed for psychosis. May need to continue to decrease  - start propranolol 10 mg BID for akathesia  - start cogentin 1 mg BID for EPS  - Continue Lithium 600 mg PO QHS for bipolar anson. Last lithium level was 0.7 on 4/07/21.  - Continue Ativan 1 mg PO QHS for anson/insomnia.   4. Therapy: group & milieu therapy  5. Medical: No acute medical needs identified  6. Collateral: No new collateral. Expand from family if patient willing.   7. Disposition: When stable, outpatient services. D/w STANLEY.

## 2021-04-13 NOTE — BH INPATIENT PSYCHIATRY PROGRESS NOTE - NSBHFUPINTERVALHXFT_PSY_A_CORE
Chart reviewed. Case discussed with interdisciplinary team. Patient seen and examined for follow up of anson and psychosis. No acute overnight events. Per report, remains odd but in good behavioral control, less disorganized. Noted sleeping in the day room on and off during the day. Compliant with standing medication. No PRNs required or requested. Per sleep log, fair sleep.     Pt reports feeling well overall this AM with "alright" mood. States she enjoyed playing games and dancing with peer yesterday and art group. Slept well and woke up feeling rested, despite napping in day room. Appetite and PO intake are good. Denies AVH, TI/TW, paranoia, IoR, grandiosity, racing thoughts. Does make bizarre statement about closing her eyes and communicating with her body language. Denies SI/HI.     Continues to report restlessness and tremor, feeling like movements are slow. Also asks to stand thru most of interview. No other physical complaints or other c/f medication side effects.

## 2021-04-14 LAB
A1C WITH ESTIMATED AVERAGE GLUCOSE RESULT: 5.5 % — SIGNIFICANT CHANGE UP (ref 4–5.6)
CHOLEST SERPL-MCNC: 142 MG/DL — SIGNIFICANT CHANGE UP
ESTIMATED AVERAGE GLUCOSE: 111 MG/DL — SIGNIFICANT CHANGE UP (ref 68–114)
HDLC SERPL-MCNC: 68 MG/DL — SIGNIFICANT CHANGE UP
LIPID PNL WITH DIRECT LDL SERPL: 62 MG/DL — SIGNIFICANT CHANGE UP
NON HDL CHOLESTEROL: 74 MG/DL — SIGNIFICANT CHANGE UP
TRIGL SERPL-MCNC: 62 MG/DL — SIGNIFICANT CHANGE UP

## 2021-04-14 PROCEDURE — 99232 SBSQ HOSP IP/OBS MODERATE 35: CPT

## 2021-04-14 RX ADMIN — LITHIUM CARBONATE 600 MILLIGRAM(S): 300 TABLET, EXTENDED RELEASE ORAL at 21:29

## 2021-04-14 RX ADMIN — Medication 1 MILLIGRAM(S): at 09:30

## 2021-04-14 RX ADMIN — ARIPIPRAZOLE 10 MILLIGRAM(S): 15 TABLET ORAL at 09:30

## 2021-04-14 RX ADMIN — Medication 1 MILLIGRAM(S): at 21:29

## 2021-04-14 NOTE — BH INPATIENT PSYCHIATRY PROGRESS NOTE - MSE UNSTRUCTURED FT
MSE:  MAGGIE/BEH: Adult female in no acute distress; dressed appropriately but disheveled appearing; calm & cooperative; fair eye contact and relatedness  SPEECH: Slow rate and volume, monotone.   MOTOR: (+) psychomotor retardation however decreased; appears postured with masked facies decreased from prior, no tremor, no other abnormal movements including TD. Rigidity in b/l LE decreased and no cog-wheeling  MOOD: “good, happy”.   AFFECT: euthymic, bright, and mood congruent; increased range.   THOUGHT PROCESS: Linear, logical, and goal-directed.   THOUGHT CONTENT: Focused on social experiences and treatment plan. Denies SI/HI; denies AVH; no evidence of delusions but some odd content about feeling like a puppet  COGNITION: Grossly intact.   INSIGHT: limited.   JUDGMENT: fair.  IC: intact

## 2021-04-14 NOTE — BH INPATIENT PSYCHIATRY PROGRESS NOTE - NSBHFUPINTERVALHXFT_PSY_A_CORE
Chart reviewed. Case discussed with interdisciplinary team. Patient seen and examined for follow up of anson and psychosis. No acute overnight events. Per report, pt expressed feeling tired this AM but has been in better behavioral control. Compliant with standing medication. No PRNs required or requested. Per sleep log, fair sleep 5.5 hours at night (also napping during the day).    Pt reports feeling well overall this AM with "good, happy" mood. States she enjoyed creative groups and times with peers yesterday. Slept well and woke up feeling rested, despite napping in day room. States she feels tired sometimes during the day, especially in groups (wonders if this is because they are boring). Appetite and PO intake are good. Denies AVH, TI/TW, paranoia, IoR, grandiosity, racing thoughts. Does make bizarre statements about feeling like a puppet when she was in the ED, and here on the unit feeling that way sometimes when she is along but not when she is connecting with peers. Talks about resting individual parts of her body at a time. Denies SI/HI.     Reports restlessness is much decreased today. Also reports decrease in stiffness and shuffling gait. No other physical complaints or other c/f medication side effects.

## 2021-04-14 NOTE — BH INPATIENT PSYCHIATRY PROGRESS NOTE - NSBHASSESSSUMMFT_PSY_ALL_CORE
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 for evaluation of anson.    Working diagnosis: Bipolar 1 Disorder, manic episode with psychotic features    Currently, patient in good behavioral control, reports good mood, with improving anson and reduction in eran psychotic symptoms; however she continues to make bizarre illogical statements each encounter. With decrease in abilify and initiation of propranolol for akathesia and cogentin for EPS sxs c/f antipsychotic-induced parkinsonism are improving.    Continued inpatient admission required for stabilization, medication optimization, safe discharge planning.      PLAN:  1. Legal: Admitted on 9.39 status  2. Safety: No reported SI/SIB/HI/VI currently on unit; continue routine observation.   -psychotropic PRN medications: haldol 5mg q6 PRN psychotic agitation; lorazepam 2 mg PO q6 hr PRN agitation; melatonin 3 mg PO QHS PRN insomnia  3. Psychiatric:   - continue Abilify at 10 mg due to concern for antipsychotic-induced parkinsonism (dec on 4/12); prescribed for psychosis. May need to continue to decrease--last discharged on 5 mg  - continue propranolol 10 mg BID for akathesia  - continue cogentin 1 mg BID for EPS  - Continue Lithium 600 mg PO QHS for bipolar anson. Last lithium level was 0.7 on 4/07/21.  - Continue Ativan 1 mg PO QHS for anson/insomnia.   4. Therapy: group & milieu therapy  5. Medical: No acute medical needs identified  6. Collateral: Plan to expand from family.  7. Disposition: When stable, outpatient services. D/w STANLEY.

## 2021-04-15 PROCEDURE — 99232 SBSQ HOSP IP/OBS MODERATE 35: CPT

## 2021-04-15 RX ADMIN — LITHIUM CARBONATE 600 MILLIGRAM(S): 300 TABLET, EXTENDED RELEASE ORAL at 21:34

## 2021-04-15 RX ADMIN — ARIPIPRAZOLE 10 MILLIGRAM(S): 15 TABLET ORAL at 08:34

## 2021-04-15 RX ADMIN — Medication 1 MILLIGRAM(S): at 08:34

## 2021-04-15 RX ADMIN — Medication 1 MILLIGRAM(S): at 21:34

## 2021-04-15 NOTE — BH INPATIENT PSYCHIATRY PROGRESS NOTE - NSBHASSESSSUMMFT_PSY_ALL_CORE
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 for evaluation of anson.    Working diagnosis: Bipolar 1 Disorder, manic episode with psychotic features    Currently, patient in good behavioral control, reports good mood, with improving anson and reduction in eran psychotic symptoms. Insight into illness more limited. With decrease in abilify and initiation of propranolol for akathesia and cogentin for EPS sxs c/f antipsychotic-induced parkinsonism are improving. Will continue abilify taper w/ close monitoring.    Continued inpatient admission required for stabilization, medication optimization, safe discharge planning.      PLAN:  1. Legal: Admitted on 9.39 status  2. Safety: No reported SI/SIB/HI/VI currently on unit; continue routine observation.   -psychotropic PRN medications: haldol 5mg q6 PRN psychotic agitation; lorazepam 2 mg PO q6 hr PRN agitation; melatonin 3 mg PO QHS PRN insomnia  3. Psychiatric:   - continue Abilify at 10 mg due to concern for antipsychotic-induced parkinsonism (dec on 4/12); prescribed for psychosis. May need to continue to decrease--last discharged on 5 mg  - continue propranolol 10 mg BID for akathesia  - continue cogentin 1 mg BID for EPS  - Continue Lithium 600 mg PO QHS for bipolar anson. Last lithium level was 0.7 on 4/07/21.  - Continue Ativan 1 mg PO QHS for anson/insomnia.   4. Therapy: group & milieu therapy  5. Medical: No acute medical needs identified  6. Collateral: Plan to expand from family.  7. Disposition: When stable, outpatient services. D/w STANLEY.    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    Working diagnosis: Bipolar 1 Disorder, manic episode with psychotic features    Currently, patient in good behavioral control, reports good mood, with improving anson and reduction in eran psychotic symptoms. Insight into illness more limited. With decrease in abilify and initiation of propranolol for akathesia and cogentin for EPS sxs c/f antipsychotic-induced parkinsonism are improving. Will continue abilify taper w/ close monitoring.    Continued inpatient admission required for stabilization, medication optimization, safe discharge planning.      PLAN:  1. Legal: Admitted on 9.39 status  2. Safety: No reported SI/SIB/HI/VI currently on unit; continue routine observation.   -psychotropic PRN medications: haldol 5mg q6 PRN psychotic agitation; lorazepam 2 mg PO q6 hr PRN agitation; melatonin 3 mg PO QHS PRN insomnia  3. Psychiatric:   - continue Abilify at 10 mg due to concern for antipsychotic-induced parkinsonism (dec on 4/12); prescribed for psychosis. May need to continue to decrease--last discharged on 5 mg  - continue propranolol 10 mg BID for akathesia  - continue cogentin 1 mg BID for EPS  - Continue Lithium 600 mg PO QHS for bipolar anson. Last lithium level was 0.7 on 4/07/21.  - Continue Ativan 1 mg PO QHS for anson/insomnia.   4. Therapy: group & milieu therapy  5. Medical: No acute medical needs identified  6. Collateral: Outreach made to Inez, SHANNA left.  7. Disposition: When stable, outpatient services. D/w STANLEY.

## 2021-04-15 NOTE — BH INPATIENT PSYCHIATRY PROGRESS NOTE - NSBHFUPINTERVALHXFT_PSY_A_CORE
Chart reviewed. Case discussed with interdisciplinary team. Patient seen and examined for follow up of anson and psychosis. No acute overnight events. Per report, pt remains disorganized and expressed some concerns about abilify this AM. Insight is also limited per d/w staff. Compliant with standing medication. No PRNs required or requested. Per sleep log, fair sleep 7 hours at night (decreased napping during the day).    Pt reports feeling well overall this AM with "good, stable" mood. States she enjoyed creative groups and times with peers yesterday. Slept well and woke up feeling rested, with decreased fatigue today and decreased desire to nap. Appetite and PO intake are good. Denies AVH, TI/TW, paranoia, IoR, grandiosity, racing thoughts. Denies SI/HI. Reports some intermittent anxiety 2/2 unit stressors (nikki when other patients are dysregulated) but coping well.     Discussed sxs leading to admission and provided psychoeducation re: BPAD. Able to describe anson and depression. Says PTA she felt "spiritual" but otherwise good and not like she was manic. Does describe she was "acting different" which is probably what worried her parents. Denies feeling paranoia towards her parents at this time, feels the break from them has been helpful.     Reports restlessness is decreased today, better able to sit calmly in chair. Also reports decrease in stiffness and shuffling gait. No other physical complaints or other c/f medication side effects.

## 2021-04-15 NOTE — BH INPATIENT PSYCHIATRY PROGRESS NOTE - MSE UNSTRUCTURED FT
MSE:  MAGGIE/BEH: Adult female in no acute distress; dressed appropriately but disheveled appearing; calm & cooperative; fair eye contact and relatedness  SPEECH: Slow rate and volume, monotone.   MOTOR: (+) psychomotor retardation however decreased; appears postured with masked facies decreased from prior, no tremor, no other abnormal movements including TD. Rigidity in b/l LE decreased and no cog-wheeling  MOOD: “good, calm”.   AFFECT: euthymic, bright, and mood congruent; increased range.   THOUGHT PROCESS: Linear, logical, and goal-directed.   THOUGHT CONTENT: Focused on bipolar disorder and treatment plan. Denies SI/HI; denies AVH; no evidence of delusions   COGNITION: Grossly intact.   INSIGHT: limited.   JUDGMENT: fair.  IC: intact

## 2021-04-16 PROCEDURE — 90853 GROUP PSYCHOTHERAPY: CPT

## 2021-04-16 PROCEDURE — 99232 SBSQ HOSP IP/OBS MODERATE 35: CPT | Mod: 25

## 2021-04-16 RX ORDER — ARIPIPRAZOLE 15 MG/1
5 TABLET ORAL DAILY
Refills: 0 | Status: DISCONTINUED | OUTPATIENT
Start: 2021-04-17 | End: 2021-04-20

## 2021-04-16 RX ADMIN — LITHIUM CARBONATE 600 MILLIGRAM(S): 300 TABLET, EXTENDED RELEASE ORAL at 21:32

## 2021-04-16 RX ADMIN — ARIPIPRAZOLE 10 MILLIGRAM(S): 15 TABLET ORAL at 08:33

## 2021-04-16 RX ADMIN — Medication 1 MILLIGRAM(S): at 21:33

## 2021-04-16 RX ADMIN — Medication 3 MILLIGRAM(S): at 21:32

## 2021-04-16 RX ADMIN — Medication 1 MILLIGRAM(S): at 08:33

## 2021-04-16 NOTE — BH INPATIENT PSYCHIATRY PROGRESS NOTE - NSBHASSESSSUMMFT_PSY_ALL_CORE
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    Working diagnosis: Bipolar 1 Disorder, manic episode with psychotic features    Currently, patient in good behavioral control, reports good mood, with improving anson and without eran psychotic symptoms. Insight into illness slowly improving, still focused on conflict with parents. With decrease in abilify and initiation of propranolol for akathesia and cogentin for EPS sxs c/f antipsychotic-induced parkinsonism are improving w/o return of psychosis. Will continue abilify taper w/ close monitoring.    Continued inpatient admission required for stabilization, medication optimization, safe discharge planning.      PLAN:  1. Legal: Admitted on 9.39 status  2. Safety: No reported SI/SIB/HI/VI currently on unit; continue routine observation.   -psychotropic PRN medications: haldol 5mg q6 PRN psychotic agitation; lorazepam 2 mg PO q6 hr PRN agitation; melatonin 3 mg PO QHS PRN insomnia  3. Psychiatric:   - decrease Abilify to 5 mg due to concern for antipsychotic-induced parkinsonism (dec on 4/12); prescribed for psychosis. tolerating taper thus far  - continue propranolol 10 mg BID for akathesia  - continue cogentin 1 mg BID for EPS  - Continue Lithium 600 mg PO QHS for bipolar anson. Last lithium level was 0.7 on 4/07/21.  - Continue Ativan 1 mg PO QHS for anson/insomnia.   4. Therapy: group & milieu therapy  5. Medical: No acute medical needs identified  6. Collateral: Outreach made to Dad, SHANNA left.  7. Disposition: When stable, outpatient services. D/w STANLEY.

## 2021-04-16 NOTE — BH INPATIENT PSYCHIATRY PROGRESS NOTE - NSBHFUPINTERVALHXFT_PSY_A_CORE
Chart reviewed. Case discussed with interdisciplinary team. Patient seen and examined for follow up of anson and psychosis. No acute overnight events. Per report, pt remains disheveled but is showering (not using shampoo in hair). Compliant with standing medication. No PRNs required or requested. Per sleep log, fair sleep 6-7 hours at night (decreased napping during the day).    Pt reports feeling well overall this AM with "good" mood. States she enjoyed creative groups and times with peers yesterday. Slept well and woke up feeling rested despite waking up very early. Continues with decreased fatigue today and decreased desire to nap. Appetite and PO intake are good. Denies AVH, TI/TW, paranoia, IoR, grandiosity, racing thoughts. Denies SI/HI.     Pt would like to have meeting with family prior to d/c to discuss how she can have more safe and autonomy. When MD suggests that parents may worry less if pt is taking meds and going to appointment pt states she wants her treatment decisions to be her own and because she thinks it is good for her-not because her parents "say so."    Reports restlessness contineus to decrease--was able to sit thru 2 full groups today. Also reports continued decrease in stiffness and shuffling gait. No other physical complaints or other c/f medication side effects.

## 2021-04-16 NOTE — BH INPATIENT PSYCHIATRY PROGRESS NOTE - MSE UNSTRUCTURED FT
MSE:  MAGGIE/BEH: Adult female in no acute distress; dressed appropriately but disheveled appearing; calm & cooperative; fair eye contact and relatedness  SPEECH: Slow rate and volume, monotone.   MOTOR: (+) psychomotor retardation however decreased; appears less postured, more reactive no tremor, no other abnormal movements including TD. Rigidity in b/l LE decreased and no cog-wheeling  MOOD: “good”.   AFFECT: euthymic, bright, and mood congruent; increased range.   THOUGHT PROCESS: Linear, logical, and goal-directed.   THOUGHT CONTENT: Focused on dynamics with parents. Denies SI/HI; denies AVH; no evidence of delusions   COGNITION: Grossly intact.   INSIGHT: limited.   JUDGMENT: fair.  IC: intact

## 2021-04-17 RX ADMIN — ARIPIPRAZOLE 5 MILLIGRAM(S): 15 TABLET ORAL at 08:48

## 2021-04-17 RX ADMIN — LITHIUM CARBONATE 600 MILLIGRAM(S): 300 TABLET, EXTENDED RELEASE ORAL at 21:25

## 2021-04-17 RX ADMIN — Medication 1 MILLIGRAM(S): at 21:26

## 2021-04-17 RX ADMIN — Medication 1 MILLIGRAM(S): at 21:25

## 2021-04-17 RX ADMIN — Medication 1 MILLIGRAM(S): at 08:48

## 2021-04-18 RX ADMIN — ARIPIPRAZOLE 5 MILLIGRAM(S): 15 TABLET ORAL at 09:04

## 2021-04-18 RX ADMIN — Medication 1 MILLIGRAM(S): at 09:04

## 2021-04-18 RX ADMIN — Medication 1 MILLIGRAM(S): at 20:23

## 2021-04-18 RX ADMIN — LITHIUM CARBONATE 600 MILLIGRAM(S): 300 TABLET, EXTENDED RELEASE ORAL at 20:23

## 2021-04-18 RX ADMIN — Medication 1 MILLIGRAM(S): at 20:24

## 2021-04-19 PROBLEM — F31.9 BIPOLAR DISORDER, UNSPECIFIED: Chronic | Status: ACTIVE | Noted: 2021-04-02

## 2021-04-19 PROCEDURE — 99232 SBSQ HOSP IP/OBS MODERATE 35: CPT

## 2021-04-19 RX ADMIN — LITHIUM CARBONATE 600 MILLIGRAM(S): 300 TABLET, EXTENDED RELEASE ORAL at 21:00

## 2021-04-19 RX ADMIN — Medication 1 MILLIGRAM(S): at 20:58

## 2021-04-19 RX ADMIN — Medication 1 MILLIGRAM(S): at 09:16

## 2021-04-19 RX ADMIN — ARIPIPRAZOLE 5 MILLIGRAM(S): 15 TABLET ORAL at 09:21

## 2021-04-19 NOTE — BH INPATIENT PSYCHIATRY PROGRESS NOTE - NSBHFUPINTERVALHXFT_PSY_A_CORE
Chart reviewed. Case discussed with interdisciplinary team. Patient seen and examined for follow up of anson and psychosis. No acute events over the weekend. Per report, pt remains with poor ADLs but is trying to improve. Was hesitant to take abilify this AM. Compliant with standing medication. No PRNs required or requested. Per sleep log, poor sleep last night (awake from 1 am on); and per staff pt slept poorly all weekend.    Pt reports that her weekend went well, enjoying karaoke and attending groups with peers. Describes mood as "not bad" but adds that it has made her feel sad to see other patient's suffering, and as a result has led to decrease in appetite and PO intake--only eating 1/3 of breakfast and lunch today. When exploring sleep disturbance over the weekend pt states that she moved to a new room which was very hot, which she thinks is making it hard for her to sleep. States that she has both been taking some daytime naps (less than prior) and at the same time that to sleep she needs to be less excited. Feels "energetic in my body with tired eyes. "    Denies AVH, TI/TW, paranoia, grandiosity, racing thoughts. Thinks that there are coincidences that happen when she watches TV-when it shows her just want she wants to see; doesn't think this is intentional. Denies SI/HI.     Reports restlessness continues to decrease. Also reports continued decrease in stiffness. No other physical complaints or other c/f medication side effects.

## 2021-04-19 NOTE — BH INPATIENT PSYCHIATRY PROGRESS NOTE - MSE UNSTRUCTURED FT
MSE:  MAGGIE/BEH: Adult female in no acute distress; dressed appropriately but disheveled appearing; calm & cooperative; fair eye contact and relatedness  SPEECH: Slow rate and volume, monotone.   MOTOR: (+) psychomotor retardation however decreased; appears less postured, more reactive no tremor, no other abnormal movements including TD. Rigidity in b/l LE nearly resolved and no cog-wheeling  MOOD: “good”.   AFFECT: euthymic, and mood congruent; decreased range.   THOUGHT PROCESS: Linear, logical, and goal-directed.   THOUGHT CONTENT: Focused on interpersonal interactions with peers. Denies SI/HI; denies AVH; no evidence of delusions but still with bizarre content  COGNITION: Grossly intact.   INSIGHT: limited.   JUDGMENT: fair.  IC: intact

## 2021-04-19 NOTE — BH DISCHARGE NOTE NURSING/SOCIAL WORK/PSYCH REHAB - PATIENT PORTAL LINK FT
You can access the FollowMyHealth Patient Portal offered by [facility] by registering at the following website: [email or web address]. By joining [employer]’s FollowMyHealth portal, you will also be able to view your health information using other applications (apps) compatible with our system.

## 2021-04-19 NOTE — BH DISCHARGE NOTE NURSING/SOCIAL WORK/PSYCH REHAB - NSDCPRGOAL_PSY_ALL_CORE
Patient has demonstrated significant progress towards psychiatric rehabilitation goals during the current hospitalization. Through utilization of groups and individual sessions, patient has achieved goals. In regards to symptoms, patient demonstrates improvements in symptoms of anson. Patients thought process is increasingly linear and patient reports improved sleep. Patient  states she will continue to follow treatment teams wellness plan in order to remain stable towards recovery. Patient was provided with a safety plan and a survey to complete prior to discharge.

## 2021-04-19 NOTE — BH DISCHARGE NOTE NURSING/SOCIAL WORK/PSYCH REHAB - NSCDUDCCRISIS_PSY_A_CORE
[facility] Well  1 [phone]) [facility]-WELL ([phone])  Text "WELL" to [phone]  Website: www.[employer]/.[employer] 1 [phone] 671-XEGA (4128) Website: www.safehorizon.org/.National Suicide Prevention Lifeline 5 (384) 961-6707/.  [employer]  1 (266) LIFENET (178-0689)/.  [facility]  [phone] [place] [address] [phone]   Hours:  Monday through Friday from 9 AM to 3 PM/.  [place] [employer] - Veterans Crisis Line  [phone], Option 1

## 2021-04-19 NOTE — BH DISCHARGE NOTE NURSING/SOCIAL WORK/PSYCH REHAB - NSBHREFERPURPOSE1_PSY_ALL_CORE
Mental Health Treatment Please inform patient that our patient access department will contact them prior to the appointment in order to obtain consent for treatment.  Patient must speak with them to have the appointment./Mental Health Treatment

## 2021-04-19 NOTE — BH DISCHARGE NOTE NURSING/SOCIAL WORK/PSYCH REHAB - NSDCPRRECOMMEND_PSY_ALL_CORE
Upon discharge, patient may benefit from beginning outpatient treatment for ongoing medication management, support, structure and psychotherapy.

## 2021-04-19 NOTE — BH INPATIENT PSYCHIATRY PROGRESS NOTE - NSBHASSESSSUMMFT_PSY_ALL_CORE
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    Working diagnosis: Bipolar 1 Disorder, manic episode with psychotic features    Currently, patient in good behavioral control, reports good mood, with improving anson and without eran psychotic symptoms. Now with concerning worsening of sleep over the past 2-3 nights, in context of abilify taper--concern for return of manic symptoms so need to continue to monitor and may need to inc dose of abilify vs lithium pending further sxs. Insight into illness slowly improving. With decrease in abilify and initiation of propranolol for akathesia and cogentin for EPS sxs c/f antipsychotic-induced parkinsonism are improving w/o return of psychosis.     Continued inpatient admission required for stabilization, medication optimization, safe discharge planning.      PLAN:  1. Legal: Admitted on 9.39 status, converted to 2PC  2. Safety: No reported SI/SIB/HI/VI currently on unit; continue routine observation.   -psychotropic PRN medications: haldol 5mg q6 PRN psychotic agitation; lorazepam 2 mg PO q6 hr PRN agitation; melatonin 3 mg PO QHS PRN insomnia  3. Psychiatric:   - continue Abilify 5 mg due to concern for antipsychotic-induced parkinsonism (dec on 4/12); prescribed for psychosis. tolerating taper thus far but watch sleep closely  - continue propranolol 10 mg BID for akathesia  - continue cogentin 1 mg BID for EPS  - Continue Lithium 600 mg PO QHS for bipolar anson. Last lithium level was 0.7 on 4/07/21.  - Continue Ativan 1 mg PO QHS for anson/insomnia.   4. Therapy: group & milieu therapy  5. Medical: No acute medical needs identified  6. Collateral: Outreach made to Dad, VM left.  7. Disposition: When stable, outpatient services. D/w STANLEY.

## 2021-04-20 PROCEDURE — 99232 SBSQ HOSP IP/OBS MODERATE 35: CPT

## 2021-04-20 RX ORDER — RISPERIDONE 4 MG/1
1 TABLET ORAL AT BEDTIME
Refills: 0 | Status: DISCONTINUED | OUTPATIENT
Start: 2021-04-20 | End: 2021-04-29

## 2021-04-20 RX ADMIN — LITHIUM CARBONATE 600 MILLIGRAM(S): 300 TABLET, EXTENDED RELEASE ORAL at 21:18

## 2021-04-20 RX ADMIN — RISPERIDONE 1 MILLIGRAM(S): 4 TABLET ORAL at 21:18

## 2021-04-20 RX ADMIN — Medication 3 MILLIGRAM(S): at 21:18

## 2021-04-20 RX ADMIN — Medication 1 MILLIGRAM(S): at 21:18

## 2021-04-20 RX ADMIN — Medication 1 MILLIGRAM(S): at 21:19

## 2021-04-20 NOTE — BH INPATIENT PSYCHIATRY PROGRESS NOTE - NSBHFUPINTERVALHXFT_PSY_A_CORE
Chart reviewed. Case discussed with interdisciplinary team. Patient seen and examined for follow up of anson and psychosis. No acute events overnight. Per report, pt remains with poor ADLs but is trying to improve. refused abilify this AM and needed significant encouragement to take medications last night. No PRNs required or requested. Per sleep log, poor sleep last night (slept only until 2:30 am, so about 4.5 hours).    Pt reports feeling tired this AM, with difficulty sleeping last night attributed to change to a new room that is cold and inappropriate sleeping attire. Pt states she doesn't want to take abilify anymore because she doesn't think she needs it, and doesn't like that it was making her restless. When giving psychoeducation re: psychotic sxs pt states that she has control over her psychotic symptoms and that if she doesn't want them to come back they won't. Talks illogically about "spiritual things" and "talking vs using body language." Is amenable to trial of risperdal at night for sleep and for psychosis symptoms as well as prevention of anson.     Reports good mood. Appetite remains lower than prior, which today she says is related to poor sleep. Reports thoughts as clear. Denies AVH, paranoid ideation. Feels like she is getting signs from God via another Zoroastrianism patient on the unit. states God works mysteriously, and that this peer is writing bible verses that she is finding helpful. Thinks its God's way of communicating to her thru the other patient. Connects this to worries about friendships made on the unit, saying that "God works forever."  Denies SI/HI.     Reports restlessness continues to decrease. Also reports continued decrease in stiffness and near resolution. No other physical complaints or other c/f medication side effects.

## 2021-04-20 NOTE — LEGAL STATUS PROGRESS NOTE - NSLEGALSTATUS_PSY_ALL_CORE
9.39 Emergency Admission
9.27 Involuntary (2PC) Converted from Emergency
9.27 Involuntary (2PC) Admission

## 2021-04-20 NOTE — BH INPATIENT PSYCHIATRY PROGRESS NOTE - NSBHASSESSSUMMFT_PSY_ALL_CORE
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    Working diagnosis: Bipolar 1 Disorder, current episode manic with psychotic features    Currently, patient in good behavioral control, reports good mood, with improving anson. Now with concerning continuation of worsening of sleep over the past 2-3 nights, in context of abilify taper and refusal of abilify this AM. Also with emergence of more Church related delusions of reference. Willing for trial of risperdal starting tonight. Insight into illness slowly improving but remains limited around psychosis specifically. Continue to monitor akathesia and EPS--improving.    Continued inpatient admission required for stabilization, medication optimization, safe discharge planning.      PLAN:  1. Legal: Admitted on 9.39 status, converted to 2PC  2. Safety: No reported SI/SIB/HI/VI currently on unit; continue routine observation.   -psychotropic PRN medications: haldol 5mg q6 PRN psychotic agitation; lorazepam 2 mg PO q6 hr PRN agitation; melatonin 3 mg PO QHS PRN insomnia  3. Psychiatric:   - discontinue Abilify 5 mg prescribed for psychosis as pt is refusing  - start trial of risperdal 1 mg HS for psychosis and with aim to also help with sleep  - continue propranolol 10 mg BID for akathesia  - continue cogentin 1 mg BID for EPS  - Continue Lithium 600 mg PO QHS for bipolar anson. Last lithium level was 0.7 on 4/07/21.  - Continue Ativan 1 mg PO QHS for anson/insomnia.   4. Therapy: group & milieu therapy  5. Medical: No acute medical needs identified  6. Collateral: Outreach made to Dad, SHANNA left.  7. Disposition: When stable, outpatient services. D/w STANLEY.

## 2021-04-20 NOTE — BH INPATIENT PSYCHIATRY PROGRESS NOTE - MSE UNSTRUCTURED FT
MSE:  MAGGIE/BEH: Adult female in no acute distress; dressed appropriately but disheveled appearing; calm & cooperative; fair eye contact and relatedness  SPEECH: Slow rate and volume, monotone.   MOTOR: (+) psychomotor retardation however decreased; appears less postured, more reactive no tremor, no other abnormal movements including TD. Rigidity in b/l LE nearly resolved and no cog-wheeling  MOOD: “good, tired”.   AFFECT: euthymic to dysphoric, and mood congruent; decreased range.   THOUGHT PROCESS: Linear, logical, and goal-directed.   THOUGHT CONTENT: Focused on treatment plan. Denies SI/HI; denies AVH; + ideas of reference (related to Pentec[employer]alism)  COGNITION: Grossly intact.   INSIGHT: limited.   JUDGMENT: fair.  IC: intact

## 2021-04-21 PROCEDURE — 90853 GROUP PSYCHOTHERAPY: CPT

## 2021-04-21 PROCEDURE — 99232 SBSQ HOSP IP/OBS MODERATE 35: CPT | Mod: 25

## 2021-04-21 RX ADMIN — Medication 1 MILLIGRAM(S): at 22:40

## 2021-04-21 RX ADMIN — RISPERIDONE 1 MILLIGRAM(S): 4 TABLET ORAL at 22:40

## 2021-04-21 RX ADMIN — LITHIUM CARBONATE 600 MILLIGRAM(S): 300 TABLET, EXTENDED RELEASE ORAL at 22:40

## 2021-04-21 NOTE — BH INPATIENT PSYCHIATRY PROGRESS NOTE - MSE UNSTRUCTURED FT
MSE:  MAGGIE/BEH: Adult female in no acute distress; dressed appropriately but disheveled appearing; calm & cooperative; fair eye contact and relatedness  SPEECH: Slow rate and volume, monotone.   MOTOR: (+) psychomotor retardation however decreased; appears less postured, more reactive no tremor, no other abnormal movements including TD. Rigidity in b/l LE nearly resolved and no cog-wheeling  MOOD: “good, tired”.   AFFECT: euthymic to dysphoric, and mood congruent; decreased range.   THOUGHT PROCESS: Linear, logical, and goal-directed.   THOUGHT CONTENT: Focused on treatment plan and preference to be off of medication. Denies SI/HI; denies AVH; + ideas of reference (related to Alevism)  COGNITION: Grossly intact.   INSIGHT: limited.   JUDGMENT: limited to fair relatd to treatment  IC: intact

## 2021-04-21 NOTE — BH INPATIENT PSYCHIATRY PROGRESS NOTE - NSBHFUPINTERVALHXFT_PSY_A_CORE
Chart reviewed. Case discussed with interdisciplinary team. Patient seen and examined for follow up of anson and psychosis. No acute events overnight. Per report, pt remains with poor ADLs but is trying to improve. Continues to require significant encouragement to take meds. AM meds held 2/2 complaint of nausea.  No PRNs required or requested. Per sleep log, slept until 2 a, then 4a-6a.     Pt reports feeling "pretty good" this AM. Reports taking a 45 min nap on the afternoon yesteday. Slept intermittently overnight and when awake "decided to pray" which helped her feel better and return to sleep. Reports feeling less restless since stopping abilify. Is concerned that number of medications is making her feel nauseous. She wants to try to see how she feels completely off all medications. States that only issue PTA was lack of sleep--thinks this is because she was "resisting" sleep and assures MD that "this time I won't do that."     Reports food mood with clear thoughts. Denies VH, does report some AH before bed "that feels like a team." Feels suspicious when RNs don't allow her to refuse medications. Continues with IoR about communications with God via a peer. States that she became more spiritual 1-2 weeks PTA and wonders what MD's spiritual beliefs are. Denies SI/HI.     Appetite remains lower than prior, wants more information re: food preferences. reports continued decrease in stiffness and near resolution. No other physical complaints or other c/f medication side effects--denies tremor, diarrhea, gait or balance difficulties, palpitations or CP.

## 2021-04-21 NOTE — BH INPATIENT PSYCHIATRY PROGRESS NOTE - NSBHASSESSSUMMFT_PSY_ALL_CORE
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    Working diagnosis: Bipolar 1 Disorder, current episode manic with psychotic features    Currently, patient in good behavioral control, reports good mood, with improving anson. Sleep slightly improved last night after 1st dose of risperdal. New complaint of nausea this AM and desire to stop all meds-so will work with patient to streamline regimen to try to prevent complete medication non-adherence. Also with continuation of more Evangelical related delusions of reference. Insight into illness continues to fluctuate, less today. Continue to monitor akathesia and EPS--improving.    Continued inpatient admission required for stabilization, medication optimization, safe discharge planning.      PLAN:  1. Legal: Admitted on 9.39 status, converted to 2PC  2. Safety: No reported SI/SIB/HI/VI currently on unit; continue routine observation.   -psychotropic PRN medications: haldol 5mg q6 PRN psychotic agitation; lorazepam 2 mg PO q6 hr PRN agitation; melatonin 3 mg PO QHS PRN insomnia  3. Psychiatric:   - continue trial of risperdal 1 mg HS for psychosis and with aim to also help with sleep  - discontinue propranolol 10 mg BID for akathesia given improvements and discontinuation of risperdal  - continue cogentin 1 mg BID for EPS  - Continue Lithium 600 mg PO QHS for bipolar anson. Last lithium level was 0.7 on 4/07/21.  - Continue Ativan 1 mg PO QHS for anson/insomnia.   4. Therapy: group & milieu therapy  5. Medical: Li level, CMP and TSH ordered for AM; nutrition c/s; monitor appetite and PO intake as well as report of nausea (no V/C/D)  6. Collateral: Outreach made to Dad, VM left.  7. Disposition: When stable, outpatient services. D/w STANLEY.

## 2021-04-22 LAB
ALBUMIN SERPL ELPH-MCNC: 4.6 G/DL — SIGNIFICANT CHANGE UP (ref 3.3–5)
ALP SERPL-CCNC: 51 U/L — SIGNIFICANT CHANGE UP (ref 40–120)
ALT FLD-CCNC: 10 U/L — SIGNIFICANT CHANGE UP (ref 4–33)
ANION GAP SERPL CALC-SCNC: 11 MMOL/L — SIGNIFICANT CHANGE UP (ref 7–14)
AST SERPL-CCNC: 18 U/L — SIGNIFICANT CHANGE UP (ref 4–32)
BILIRUB SERPL-MCNC: 0.9 MG/DL — SIGNIFICANT CHANGE UP (ref 0.2–1.2)
BUN SERPL-MCNC: 7 MG/DL — SIGNIFICANT CHANGE UP (ref 7–23)
CALCIUM SERPL-MCNC: 9.7 MG/DL — SIGNIFICANT CHANGE UP (ref 8.4–10.5)
CHLORIDE SERPL-SCNC: 100 MMOL/L — SIGNIFICANT CHANGE UP (ref 98–107)
CO2 SERPL-SCNC: 24 MMOL/L — SIGNIFICANT CHANGE UP (ref 22–31)
CREAT SERPL-MCNC: 0.86 MG/DL — SIGNIFICANT CHANGE UP (ref 0.5–1.3)
GLUCOSE SERPL-MCNC: 157 MG/DL — HIGH (ref 70–99)
LITHIUM SERPL-MCNC: 0.9 MMOL/L — SIGNIFICANT CHANGE UP (ref 0.6–1.2)
POTASSIUM SERPL-MCNC: 3.4 MMOL/L — LOW (ref 3.5–5.3)
POTASSIUM SERPL-SCNC: 3.4 MMOL/L — LOW (ref 3.5–5.3)
PROT SERPL-MCNC: 7.6 G/DL — SIGNIFICANT CHANGE UP (ref 6–8.3)
SODIUM SERPL-SCNC: 135 MMOL/L — SIGNIFICANT CHANGE UP (ref 135–145)

## 2021-04-22 PROCEDURE — 99232 SBSQ HOSP IP/OBS MODERATE 35: CPT | Mod: 25

## 2021-04-22 PROCEDURE — 90853 GROUP PSYCHOTHERAPY: CPT

## 2021-04-22 RX ORDER — BENZTROPINE MESYLATE 1 MG
0.5 TABLET ORAL
Refills: 0 | Status: DISCONTINUED | OUTPATIENT
Start: 2021-04-22 | End: 2021-04-29

## 2021-04-22 RX ADMIN — Medication 1 MILLIGRAM(S): at 08:17

## 2021-04-22 RX ADMIN — Medication 0.5 MILLIGRAM(S): at 21:08

## 2021-04-22 RX ADMIN — LITHIUM CARBONATE 600 MILLIGRAM(S): 300 TABLET, EXTENDED RELEASE ORAL at 21:08

## 2021-04-22 RX ADMIN — Medication 1 MILLIGRAM(S): at 21:08

## 2021-04-22 RX ADMIN — RISPERIDONE 1 MILLIGRAM(S): 4 TABLET ORAL at 21:08

## 2021-04-22 NOTE — BH TREATMENT PLAN - NSTXMEDICINTERRN_PSY_ALL_CORE
Educate regarding medication, dose, indication and possible side effects.
Educate regarding medication regime. Reinforce importance of medication compliance.
Monitor mood and behavior.

## 2021-04-22 NOTE — BH INPATIENT PSYCHIATRY PROGRESS NOTE - MSE UNSTRUCTURED FT
MSE:  MAGGIE/BEH: Adult female in no acute distress; dressed appropriately but disheveled appearing; calm & cooperative; fair eye contact and relatedness  SPEECH: normal rate and volume, monotone.   MOTOR: (+) mild psychomotor retardation; appears less postured, more reactive, no tremor, no other abnormal movements including TD. no rigidity  MOOD: “good”.   AFFECT: euthymic to anxious, and mood congruent; increased range.   THOUGHT PROCESS: Linear, logical, and goal-directed.   THOUGHT CONTENT: Focused on treatment plan and preference to be off of medication. Denies SI/HI; denies AVH; + ideas of reference (related to Jain)  COGNITION: Grossly intact.   INSIGHT: poor.   JUDGMENT: limited to fair related to treatment  IC: intact

## 2021-04-22 NOTE — BH TREATMENT PLAN - NSTXPATIENTPARTICIPATE_PSY_ALL_CORE
Patient participated in identification of needs/problems/goals for treatment
Patient participated in identification of needs/problems/goals for treatment/Patient participated in defining interventions/Patient participated in development of after care plan
Patient participated in identification of needs/problems/goals for treatment/Patient participated in defining interventions/Patient participated in development of after care plan

## 2021-04-22 NOTE — BH TREATMENT PLAN - NSTXDCOPNOINTERSW_PSY_ALL_CORE
SWI will work with patient to provide psychoeducation and encourage patient to participate in groups. SWI will also provide patient with informaton about appropriate aftercare. SWI will work with the patient to find appropriate and refer to the necessary providers.
SWI will work with patient to provide psychoeducation and encourage patient to participate in groups. SWI will also provide patient with informaton about appropriate aftercare. SWI will work with the patient to find appropriate and refer to the necessary providers.
Writer will work with patient to find treatment in the community

## 2021-04-22 NOTE — BH TREATMENT PLAN - NSTXPLANTHERAPYSESSIONSFT_PSY_ALL_CORE
04-16-21  Type of therapy: Dialectical behavior therapy,Creative arts therapy,Leisure development,Music therapy,Peer advocate  Type of session: Individual  Level of patient participation: Engaged  Duration of participation: 30 minutes  Therapy conducted by: Psych rehab  Therapy Summary: Patient has attended approximately 75% of psychiatric rehabilitation groups over the last week. Patient is appropriately engaged and participates in group discussions and activities.  Patient is visible on the unit and interacts with staff and peers. Patient is verbal and cooperative with staff.  Patient is in fair control of impulses and is not a behavioral management issue on the unit.  
  04-09-21  Type of therapy: Creative arts therapy,Leisure development  Type of session: Individual  Level of patient participation: Participated with encouragement  Duration of participation: 15 minutes  Therapy conducted by: Psych rehab  Therapy Summary: Patient has attended approximately 25% of psychiatric rehabilitation groups over the past week. Patient is increasingly appropriately engaged and participates in group discussions and activities at times requiring encouragement to do so.  Patient is visible on the unit and interacts with staff and peers. Patient is verbal and cooperative with staff.  Patient is in improved control of impulses and is not a behavioral management issue on the unit.

## 2021-04-22 NOTE — BH INPATIENT PSYCHIATRY PROGRESS NOTE - NSBHASSESSSUMMFT_PSY_ALL_CORE
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    Working diagnosis: Bipolar 1 Disorder, current episode manic with psychotic features    Currently, patient in good behavioral control, reports good mood, with improving anson. However, continues with increase in emerging Latter-day related ideas of reference. Sleep more improved last night after 2nd dose of risperdal, but pt has new concern for nausea and diarrhea--will continue to monitor.  Also continue to work with patient to streamline regimen to try to prevent complete medication non-adherence. Insight into illness continues to fluctuate, less today. Continue to monitor akathesia and EPS--improving to resolved.    Continued inpatient admission required for stabilization, medication optimization, safe discharge planning.      PLAN:  1. Legal: Admitted on 9.39 status, converted to 2PC  2. Safety: No reported SI/SIB/HI/VI currently on unit; continue routine observation.   -psychotropic PRN medications: haldol 5mg q6 PRN psychotic agitation; lorazepam 2 mg PO q6 hr PRN agitation; melatonin 3 mg PO QHS PRN insomnia  3. Psychiatric:   - continue trial of risperdal 1 mg HS for psychosis and with aim to also help with sleep; may need to d/c if related to N/D  - discontinue propranolol 10 mg BID for akathesia given improvements and discontinuation of risperdal  - begin to taper cogentin to 0.5 mg BID for EPS-no stiffness today  - Continue Lithium 600 mg PO QHS for bipolar anson. lithium level 4/22 0.9   - Continue Ativan 1 mg PO QHS for anson/insomnia.   4. Therapy: group & milieu therapy  5. Medical: nutrition c/s completed; monitor appetite and PO intake as well as report of nausea and diarrhea  -labs today notable for K of 3.4 but otherwise CMP and TSH wnl  6. Collateral: Outreach made to Dad, VM left-will attempt again today. Pt also provided # for her mother so will try if needed.  7. Disposition: When stable, outpatient services. D/w STANLEY.

## 2021-04-22 NOTE — BH TREATMENT PLAN - NSTXDISORGINTERRN_PSY_ALL_CORE
Monitor mood and behavior. Provide staff redirection and support as needed.
Monitor behavior for chnges in mood/ behavior.
Monitor mood and behavior.

## 2021-04-22 NOTE — CHART NOTE - NSCHARTNOTEFT_GEN_A_CORE
Nutrition f/u note:  Re-consult ordered for assessment and education/patient request.  Patient continues treatment at [facility] for psychiatric disorder.  Diet: Regular.  Medications: Risperdal; Lithium.  Weight: 4/17 110#/50 kg stable.  Labs: 4/7 Bmp + 4/14 lipids wnl.  Patient states " My appetite was low yesterday but today was good. I ate everything for breakfast". Has food preferences in place. Discussed menu and other food options. Preferences updated and will be implemented.   Plan: Maintain diet as ordered.  Provide food preferences to optimize oral intake.  Encourage po intake.  RDN remains available prn.   Alla Blankenship, MS RDN  Pager #[phone]

## 2021-04-22 NOTE — BH TREATMENT PLAN - NSTXDISORGINTERMD_PSY_ALL_CORE
Lithium 600mgqhs, trial of risperdal
titrating Abilify,  and Lithium 600mgqhs
Lithium 600mgqhs, tapering abilify to find lowest therapeutic dose to minimize akathesia and EPS

## 2021-04-22 NOTE — BH INPATIENT PSYCHIATRY PROGRESS NOTE - NSBHFUPINTERVALHXFT_PSY_A_CORE
Chart reviewed. Case discussed with interdisciplinary team. Patient seen and examined for follow up of anson and psychosis. No acute events overnight. Per report, pt remains with poor ADLs but is trying to improve. Took medications with less resistance. No PRNs required or requested. Per sleep log, slept after 12:30 am.    On interview pt reports doing well, able to dance and move more freely without stiffness. Feels she can relax more physically and emotionally. Describes mood as "good." Reports clear thoughts. Denies AVH. Continues with IoR related to God--feels like she is God's puppet, like he is using her as a vessel and helping to give her and others answers. States the first time this type of spiritual experience happened to her was in  and it has happened intermittently since. Denies SI/HI.     Appetite remains lower than prior, had good meeting with nutritionist. reports resolution of stiffness. Is concerned about nausea starting last night, stomach ache and episode of diarrhea this AM. Is worried this may be 2/2 medications, but also acknowledges that risperdal has been helping her sleep. No other physical complaints or other c/f medication side effects--denies tremor, diarrhea, gait or balance difficulties, palpitations or CP.  Had positive visit with Dad yesterday-although describes that he continues to seem so worried about her that it makes her anxious. When discussing overall treatment plan and progress pt states that she still feels like she doesn't need any medication, and is here in the hospital to get space from her family and help other patients. Psychoed provided with some response--willing to continue meds for now if we can streamline regimen.

## 2021-04-22 NOTE — BH TREATMENT PLAN - NSDCCRITERIA_PSY_ALL_CORE
symptom stabilization with improved level of functioning  CGI<=2

## 2021-04-22 NOTE — BH TREATMENT PLAN - NSCMSPTSTRENGTHS_PSY_ALL_CORE
Radha/spirituality/Future/goal oriented/Leisure interest
Radha/spirituality/Future/goal oriented/Leisure interest/Supportive family
Radha/spirituality/Future/goal oriented/Leisure interest

## 2021-04-22 NOTE — BH TREATMENT PLAN - NSTXDISORGGOAL_PSY_ALL_CORE
Will demonstrate purposeful and predictable thoughts/behaviors by making a request
Will make at least 3 goal and reality oriented statements during therapy
Will make at least 3 goal and reality oriented statements during therapy

## 2021-04-22 NOTE — BH TREATMENT PLAN - NSTXCAREGIVERPARTICIPATE_PSY_P_CORE
Family/Caregiver participated in identification of needs/problems/goals for treatment
Family/Caregiver participated in identification of needs/problems/goals for treatment/Family/Caregiver participated in defining interventions/Family/Caregiver participated in development of after care plan

## 2021-04-22 NOTE — BH TREATMENT PLAN - NSTXMEDICGOAL_PSY_ALL_CORE
Take all medications as prescribed
Take all medications as prescribed
Be able to state dosages and times to take medications
Interval history reviewed, patient examined.      1d infant [ x]   [ ] C/S        History   Well infant, term, appropriate for gestational age, ready for discharge   Unremarkable nursery course   Infant is doing well.  No active medical issues. Voiding and stooling well.    Physical Examination    Weight today: 3515g  Overall weight change of  1.7     %    General Appearance: comfortable, no distress, no dysmorphic features  Head: normocephalic, anterior fontanelle open and flat  Eyes/ENT: red reflex present b/l, palate intact  Neck/Clavicles: no masses, no crepitus  Chest: no grunting, flaring or retractions  CV: RRR, nl S1 S2, no murmurs, well perfused. Femoral pulses 2+  Abdomen: soft, non-distended, no masses, no organomegaly  : [ ] normal female  [x ] normal male, testes descended b/l  Ext: Full range of motion. No hip click. Normal digits.  Neuro: good tone, moves all extremities well, symmetric jocelynn, +suck,+ grasp.  Skin: no lessions, no Jaundice      Hearing screen passed  CHD passed Hep B vaccine [ x] given  [ ] to be given at PMD  Bilirubin [ x] TCB  [ ] serum     pending      @   24      hours of age    Assesment:  Well baby ready for discharge

## 2021-04-22 NOTE — BH TREATMENT PLAN - NSTXMEDICINTERPR_PSY_ALL_CORE
Pt's goal is to demonstrate medication compliance for seven consecutive days.
97.8
Patient has demonstrated daily medication adherence. Psychiatric rehabilitation staff will continue to engage patient in order for patient to demonstrate medication compliance to manage symptoms.
Patient was able to increasingly demonstrate medication adherence. Psychiatric rehabilitation staff will continue to engage patient in order for patient to demonstrate medication compliance.

## 2021-04-23 PROCEDURE — 99232 SBSQ HOSP IP/OBS MODERATE 35: CPT | Mod: 25

## 2021-04-23 PROCEDURE — 90853 GROUP PSYCHOTHERAPY: CPT

## 2021-04-23 RX ADMIN — Medication 0.5 MILLIGRAM(S): at 08:21

## 2021-04-23 RX ADMIN — RISPERIDONE 1 MILLIGRAM(S): 4 TABLET ORAL at 20:55

## 2021-04-23 RX ADMIN — Medication 1 MILLIGRAM(S): at 20:56

## 2021-04-23 RX ADMIN — Medication 0.5 MILLIGRAM(S): at 20:55

## 2021-04-23 RX ADMIN — LITHIUM CARBONATE 600 MILLIGRAM(S): 300 TABLET, EXTENDED RELEASE ORAL at 20:55

## 2021-04-23 NOTE — BH INPATIENT PSYCHIATRY PROGRESS NOTE - NSBHFUPINTERVALHXFT_PSY_A_CORE
Chart reviewed. Case discussed with interdisciplinary team. Patient seen and examined for follow up of anson and psychosis. No acute events overnight. Per report, pt remains with poor ADLs but is trying to improve. Took medications with encouragement. No PRNs required or requested. Per sleep log, slept thru the night.    On interview pt reports feeling good today, with good mood and a little socially driven anxiety around male peer on unit. Slept well o/n, reporting that praying before bed his helpful. Reports significant decrease in nausea and denies constipation or diarrhea. Asks appropriate questions about lithium level, and asks MD if there are natural sources of lithium like food that she can take instead of medications. Psychoeducation provided. Reports that appetite and PO intake are improved.     Reports clear thoughts. Denies AVH. Continues with IoR related to God--connecting numbers on doors on the unit to bible pages-which are messages to her from the holy spirit. Denies SI/HI.     No other physical complaints or c/f medication side effects. restlessness is resolved, and shaking/stiffness is significantly reduced.

## 2021-04-23 NOTE — BH INPATIENT PSYCHIATRY PROGRESS NOTE - NSBHASSESSSUMMFT_PSY_ALL_CORE
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    Working diagnosis: Bipolar 1 Disorder, current episode manic with psychotic features    Currently, patient in good behavioral control, reports good mood, with improving anson. However, continues with increase in emerging Judaism related ideas of reference. Sleep remains improved, with improved medication tolerance (significant dec in nausea) Also continue to work with patient to streamline regimen to try to prevent complete medication non-adherence. Insight into illness continues to fluctuate.  Continue to monitor EPS and fine tremor.    Continued inpatient admission required for stabilization, medication optimization, safe discharge planning.      PLAN:  1. Legal: Admitted on 9.39 status, converted to [facility]  2. Safety: No reported SI/SIB/HI/VI currently on unit; continue routine observation.   -psychotropic PRN medications: haldol 5mg q6 PRN psychotic agitation; lorazepam 2 mg PO q6 hr PRN agitation; melatonin 3 mg PO QHS PRN insomnia  3. Psychiatric:   - continue trial of risperdal 1 mg HS for psychosis and with aim to also help with sleep; may need to d/c if related to N/D  - continue cogentin to 0.5 mg BID for EPS-no stiffness today  - Continue Lithium 600 mg PO QHS for bipolar anson. lithium level 4/22 0.9   - Continue Ativan 1 mg PO QHS for anson/insomnia.   4. Therapy: group & milieu therapy  5. Medical: nutrition c/s completed; monitor appetite and PO intake as well as report of nausea and diarrhea  -labs today notable for K of 3.4 but otherwise CMP and TSH wnl  6. Collateral: Spoke to Dad 4/22, will schedule family meeting prior to discharge  7. Disposition: When stable, outpatient services. D/w STANLEY.

## 2021-04-23 NOTE — BH INPATIENT PSYCHIATRY PROGRESS NOTE - MSE UNSTRUCTURED FT
MSE:  MAGGIE/BEH: Adult female in no acute distress; dressed appropriately but disheveled appearing; calm & cooperative; fair eye contact and relatedness  SPEECH: normal rate and volume, monotone.   MOTOR: (+) mild psychomotor retardation; appears less postured, more reactive, mild b/l hand tremor, no other abnormal movements including TD. no rigidity  MOOD: “good”.   AFFECT: euthymic to anxious, and mood congruent; increased range.   THOUGHT PROCESS: Linear, logical, and goal-directed.   THOUGHT CONTENT: Focused on treatment plan and preference to be off of medication. Denies SI/HI; denies AVH; + ideas of reference (related to Mu-ism)  COGNITION: Grossly intact.   INSIGHT: poor.   JUDGMENT: limited to fair related to treatment  IC: intact

## 2021-04-24 RX ADMIN — LITHIUM CARBONATE 600 MILLIGRAM(S): 300 TABLET, EXTENDED RELEASE ORAL at 21:00

## 2021-04-24 RX ADMIN — Medication 1 MILLIGRAM(S): at 21:00

## 2021-04-24 RX ADMIN — Medication 0.5 MILLIGRAM(S): at 09:18

## 2021-04-24 RX ADMIN — Medication 3 MILLIGRAM(S): at 21:00

## 2021-04-24 RX ADMIN — Medication 0.5 MILLIGRAM(S): at 21:00

## 2021-04-24 RX ADMIN — RISPERIDONE 1 MILLIGRAM(S): 4 TABLET ORAL at 21:00

## 2021-04-25 RX ADMIN — LITHIUM CARBONATE 600 MILLIGRAM(S): 300 TABLET, EXTENDED RELEASE ORAL at 21:00

## 2021-04-25 RX ADMIN — RISPERIDONE 1 MILLIGRAM(S): 4 TABLET ORAL at 21:00

## 2021-04-25 RX ADMIN — Medication 0.5 MILLIGRAM(S): at 21:00

## 2021-04-25 RX ADMIN — Medication 1 MILLIGRAM(S): at 21:00

## 2021-04-25 RX ADMIN — Medication 3 MILLIGRAM(S): at 21:00

## 2021-04-25 RX ADMIN — Medication 0.5 MILLIGRAM(S): at 09:14

## 2021-04-26 PROCEDURE — 99232 SBSQ HOSP IP/OBS MODERATE 35: CPT | Mod: 25

## 2021-04-26 PROCEDURE — 90853 GROUP PSYCHOTHERAPY: CPT

## 2021-04-26 RX ADMIN — Medication 1 MILLIGRAM(S): at 20:46

## 2021-04-26 RX ADMIN — RISPERIDONE 1 MILLIGRAM(S): 4 TABLET ORAL at 20:46

## 2021-04-26 RX ADMIN — Medication 0.5 MILLIGRAM(S): at 08:13

## 2021-04-26 RX ADMIN — LITHIUM CARBONATE 600 MILLIGRAM(S): 300 TABLET, EXTENDED RELEASE ORAL at 20:46

## 2021-04-26 RX ADMIN — Medication 0.5 MILLIGRAM(S): at 20:46

## 2021-04-26 NOTE — BH INPATIENT PSYCHIATRY PROGRESS NOTE - NSBHFUPINTERVALHXFT_PSY_A_CORE
Chart reviewed. Case discussed with interdisciplinary team. Patient seen and examined for follow up of anson and psychosis. No acute events over the weekend. Per report, pt is taking better care of ADLs, enjoying dance and social groups, more organized. Took medications with encouragement, although less than prior. Given melatonin PRN over the weekend for sleep with good effect. Per sleep log, slept thru the night.    On interview pt reports feeling good today, with good mood. Slept well o/n, reporting being given melatonin (not clearly asking for it). Denies nausea, constipation or diarrhea. Reports more notable improvement in appetite and PO intake-eating at all meals and asking for ensure if preferred foods not available.     Reports clear thoughts. Denies AVH. Continues with a lot of thinking related to God--saying she interpreted a peer's t-shirt with a bible quote on it as a positive and reassuring sign (adding that you can always see signs, depending on your perspective). Denies paranoid ideation. Denies SI/HI.     No other physical complaints or c/f medication side effects. shaking/stiffness is significantly reduced. Reports some intermittent restlesness during "boring groups" but overall reports decrease from prior. No tremor. Discussed working towards discharge plan-becomes tearful when discussing how parents worries impact her and her desire for freedom and independence. Asked to have family psychoeducation meeting.

## 2021-04-26 NOTE — BH INPATIENT PSYCHIATRY PROGRESS NOTE - MSE UNSTRUCTURED FT
MSE:  MAGGIE/BEH: Adult female in no acute distress; dressed appropriately but mildly disheveled appearing; calm & cooperative; fair eye contact and relatedness  SPEECH: normal rate and volume, monotone.   MOTOR: (+) mild psychomotor retardation; appears less postured, more reactive, no tremor, no other abnormal movements including TD. no rigidity  MOOD: “good”.   AFFECT: euthymic, and mood congruent; increased range.   THOUGHT PROCESS: Linear, logical, and goal-directed.   THOUGHT CONTENT: Focused on treatment plan and relationship with parents. Denies SI/HI; denies AVH; + possible ideas of reference (related to Hoahaoism, more reality based than prior)  COGNITION: Grossly intact.   INSIGHT: limited   JUDGMENT: limited to fair related to treatment  IC: intact

## 2021-04-26 NOTE — BH INPATIENT PSYCHIATRY PROGRESS NOTE - NSBHASSESSSUMMFT_PSY_ALL_CORE
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    Working diagnosis: Bipolar 1 Disorder, current episode manic with psychotic features    Currently, patient in good behavioral control, reports good mood, with improving anson. Denominational related ideas of reference persist, although today more based in reality and less frankly delusional. Sleep remains improved, with improved medication tolerance (nausea resolved.) Also continue to work with patient to streamline regimen to try to prevent complete medication non-adherence. Insight into illness continues to fluctuate, today improved insight into need for medications and wanting to discuss treatment engagement as a way to compromise with parents.  Continue to monitor EPS and fine tremor.    Continued inpatient admission required for stabilization, medication optimization, safe discharge planning.      PLAN:  1. Legal: Admitted on 9.39 status, converted to 2PC  2. Safety: No reported SI/SIB/HI/VI currently on unit; continue routine observation.   -psychotropic PRN medications: haldol 5mg q6 PRN psychotic agitation; lorazepam 2 mg PO q6 hr PRN agitation; melatonin 3 mg PO QHS PRN insomnia  3. Psychiatric:   - continue trial of risperdal 1 mg HS for psychosis and with aim to also help with sleep; tolerating better; pt does not want to increase dose  - continue cogentin to 0.5 mg BID for EPS-no stiffness today  - Continue Lithium 600 mg PO QHS for bipolar anson. lithium level 4/22 0.9   - Continue Ativan 1 mg PO QHS for anson/insomnia.   4. Therapy: group & milieu therapy  5. Medical: nutrition c/s completed; monitor appetite and PO intake as well as report of nausea and diarrhea  -labs today notable for K of 3.4 but otherwise CMP and TSH wnl  6. Collateral: Spoke to Dad 4/22, will schedule family meeting prior to discharge  7. Disposition: When stable, outpatient services via [facility]; later this week if gains maintained

## 2021-04-27 PROCEDURE — 99232 SBSQ HOSP IP/OBS MODERATE 35: CPT | Mod: 25

## 2021-04-27 PROCEDURE — 90853 GROUP PSYCHOTHERAPY: CPT

## 2021-04-27 RX ADMIN — Medication 0.5 MILLIGRAM(S): at 08:04

## 2021-04-27 RX ADMIN — Medication 1 MILLIGRAM(S): at 21:41

## 2021-04-27 RX ADMIN — RISPERIDONE 1 MILLIGRAM(S): 4 TABLET ORAL at 21:40

## 2021-04-27 RX ADMIN — Medication 0.5 MILLIGRAM(S): at 21:41

## 2021-04-27 RX ADMIN — LITHIUM CARBONATE 600 MILLIGRAM(S): 300 TABLET, EXTENDED RELEASE ORAL at 21:40

## 2021-04-27 NOTE — BH PSYCHOLOGY - GROUP THERAPY NOTE - NSPSYCHOLGRPDBTINT_PSY_A_CORE FT
taught emotion regulation skill  
taught mindfulness skill  
taught mindfulness skill      
taught emotion regulation skill  
taught emotion regulation skill    
taught interpersonal effectiveness skill    
Taught Distress Tolerance
taught mindfulness skill   
taught DT skill
taught emotion regulation skill  
taught emotion regulation skill

## 2021-04-27 NOTE — BH INPATIENT PSYCHIATRY PROGRESS NOTE - NSBHASSESSSUMMFT_PSY_ALL_CORE
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    Working diagnosis: Bipolar 1 Disorder, current episode manic with psychotic features    Currently, patient in good behavioral control, reports good mood, with improving anson. Denominational related over-valued ideas persist, although today more based in reality and less frankly delusional. Sleep remains improved, with improved medication tolerance (nausea resolved.) Will continue to work with patient to streamline regimen and increase insight into illness to improve compliance with treatment. Today again with decreased insight into symptoms of illness and need for medications to assist with treatment (not just behavioral choices). Continue to monitor EPS and fine tremor-much improved since d/c of abilify.    Continued inpatient admission required for stabilization, medication optimization, safe discharge planning.      PLAN:  1. Legal: Admitted on 9.39 status, converted to 2PC  2. Safety: No reported SI/SIB/HI/VI currently on unit; continue routine observation.   -psychotropic PRN medications: haldol 5mg q6 PRN psychotic agitation; lorazepam 2 mg PO q6 hr PRN agitation; melatonin 3 mg PO QHS PRN insomnia  3. Psychiatric:   - continue trial of risperdal 1 mg HS for psychosis and with aim to also help with sleep; tolerating better; pt does not want to increase dose  - continue cogentin to 0.5 mg BID for EPS-no stiffness today  - Continue Lithium 600 mg PO QHS for bipolar anson. lithium level 4/22 0.9   - Continue Ativan 1 mg PO QHS for anson/insomnia.   4. Therapy: group & milieu therapy  5. Medical: nutrition c/s completed; monitor appetite and PO intake as well as report of nausea and diarrhea  -labs today notable for K of 3.4 but otherwise CMP and TSH wnl  6. Collateral: Spoke to Dad 4/22 and again 4/26--family feels pt is doing well overall and better than admission, although they still remain c/f medication side effects. FM scheduled via Zoom today at 2pm  7. Disposition: When stable, outpatient services via [facility]; later this week if gains maintained

## 2021-04-27 NOTE — BH CHART NOTE - NSEVENTNOTEFT_PSY_ALL_CORE
Family meeting held this afternoon with MD, patient, mother and father via Zoom. Reviewed patients presenting symptoms, medications and outpatient treatment plan. Patient was able to voice desire for space and independence from parents which they validated. Discussed warning signs of no sleep and medication non-compliance and how to trouble shoot. patient is willing to provide consent for outpatient MD to speak with parents to assist with care coordination and for family to discuss concerns re: patient directly with MD if they arise.

## 2021-04-27 NOTE — BH INPATIENT PSYCHIATRY PROGRESS NOTE - NSBHFUPINTERVALHXFT_PSY_A_CORE
Chart reviewed. Case discussed with interdisciplinary team. Patient seen and examined for follow up of anson and psychosis. No acute events overnight. Per report, pt is taking better care of ADLs, attending groups. Took medications, no PRNs. Per sleep log, slept 1a-5:30a.     On interview pt reports feeling good today, with good mood. Regarding sleep reports going to sleep early initially, then waking up and doing some activities until 1am when she fell back to sleep. Reports energy as good this morning. Denies nausea, constipation or diarrhea-but does report 1 episode of vomiting last night after dinner because she ate too quickly. Says this has happened before at home. Reports sustained improvement in appetite and PO intake-eating at all meals and asking for ensure if preferred foods not available.     Reports clear thoughts. Denies AVH. Continues with a lot of thinking related to God--deriving meaning from bible passages selected by peers and describing herself as "pretty spiritual." Denies paranoid ideation, no grandiosity. Denies SI/HI.     No other physical complaints or c/f medication side effects. shaking/stiffness is significantly reduced. Restlessness also reduced today. No tremor. Discussed working towards discharge plan-patient again expressed feeling like she has control over the behaviors that family and MDs in ED considered "not normal" and wants to know specifically what she said/did in ED that led to hospitalization. Is ambivalent re: continuing medication long-term, does not want WETZEL.

## 2021-04-27 NOTE — BH INPATIENT PSYCHIATRY PROGRESS NOTE - MSE UNSTRUCTURED FT
MSE:  MAGGIE/BEH: Adult female in no acute distress; dressed appropriately but mildly disheveled appearing; calm & cooperative; fair eye contact and relatedness  SPEECH: normal rate and volume, monotone.   MOTOR: (+) mild psychomotor retardation; appears less postured, more reactive, no tremor, no other abnormal movements including TD. no rigidity  MOOD: “good”.   AFFECT: euthymic, and mood congruent; increased range.   THOUGHT PROCESS: Linear, logical, and goal-directed.   THOUGHT CONTENT: Focused on treatment plan and skepticism about symptoms present at admission. Denies SI/HI; denies AVH; + possible ideas of reference (related to Restorationism, more reality based than prior) but no eran delusions  COGNITION: Grossly intact.   INSIGHT: limited   JUDGMENT: limited to fair related to treatment  IC: intact

## 2021-04-28 PROCEDURE — 99232 SBSQ HOSP IP/OBS MODERATE 35: CPT | Mod: 25

## 2021-04-28 PROCEDURE — 90853 GROUP PSYCHOTHERAPY: CPT

## 2021-04-28 RX ORDER — LITHIUM CARBONATE 300 MG/1
1 TABLET, EXTENDED RELEASE ORAL
Qty: 30 | Refills: 0
Start: 2021-04-28 | End: 2021-05-27

## 2021-04-28 RX ORDER — BENZTROPINE MESYLATE 1 MG
1 TABLET ORAL
Qty: 60 | Refills: 0
Start: 2021-04-28 | End: 2021-05-27

## 2021-04-28 RX ORDER — LANOLIN ALCOHOL/MO/W.PET/CERES
1 CREAM (GRAM) TOPICAL
Qty: 0 | Refills: 0 | DISCHARGE
Start: 2021-04-28

## 2021-04-28 RX ORDER — RISPERIDONE 4 MG/1
1 TABLET ORAL
Qty: 30 | Refills: 0
Start: 2021-04-28 | End: 2021-05-27

## 2021-04-28 RX ADMIN — Medication 0.5 MILLIGRAM(S): at 08:16

## 2021-04-28 RX ADMIN — Medication 1 MILLIGRAM(S): at 21:43

## 2021-04-28 RX ADMIN — Medication 0.5 MILLIGRAM(S): at 21:43

## 2021-04-28 RX ADMIN — LITHIUM CARBONATE 600 MILLIGRAM(S): 300 TABLET, EXTENDED RELEASE ORAL at 21:43

## 2021-04-28 RX ADMIN — RISPERIDONE 1 MILLIGRAM(S): 4 TABLET ORAL at 21:43

## 2021-04-28 NOTE — BH PSYCHOLOGY - GROUP THERAPY NOTE - NSPSYCHOLGRPDBTGOAL_PSY_A_CORE
reduce mood and affective lability/reduce impulsive self-defeating behavior/improve ability to indentify feelings/improve ability to communicate feelings/reduce vulnerability to emotional dysregualation
reduce mood and affective lability
reduce mood and affective lability/reduce impulsive self-defeating behavior/improve ability to indentify feelings/improve ability to communicate feelings/reduce vulnerability to emotional dysregualation
reduce mood and affective lability/reduce impulsive self-defeating behavior/reduce vulnerability to emotional dysregualation
reduce mood and affective lability/improve ability to indentify feelings/improve ability to communicate feelings/reduce vulnerability to emotional dysregualation
reduce mood and affective lability/reduce impulsive self-defeating behavior/improve ability to indentify feelings/improve ability to communicate feelings/reduce vulnerability to emotional dysregualation
reduce mood and affective lability/reduce impulsive self-defeating behavior/improve ability to indentify feelings/improve ability to communicate feelings/reduce vulnerability to emotional dysregualation
reduce mood and affective lability/reduce impulsive self-defeating behavior/reduce vulnerability to emotional dysregualation

## 2021-04-28 NOTE — BH INPATIENT PSYCHIATRY DISCHARGE NOTE - NSDCMRMEDTOKEN_GEN_ALL_CORE_FT
benztropine 0.5 mg oral tablet: 1 tab(s) orally 2 times a day  lithium 600 mg oral capsule: 1 cap(s) orally once a day (at bedtime)  LORazepam 1 mg oral tablet: 1 tab(s) orally once a day (at bedtime) MDD:1 mg  melatonin 3 mg oral tablet: 1 tab(s) orally once a day (at bedtime), As needed, Insomnia  risperiDONE 1 mg oral tablet: 1 tab(s) orally once a day (at bedtime)

## 2021-04-28 NOTE — BH INPATIENT PSYCHIATRY DISCHARGE NOTE - VETERAN
PA-C at BS speaking with pt at this time for initial examination.   
Pt ambulatory to restroom with steady gait. Pt given cup for UA sample.   
Pt had route canal done yesterday, was given hydrocodone PO and an antibiotic to take post procedure. Pt reporting she took a hydrocodone for pain relief this AM, denies that she started taking oral antibiotic. Pt reporting she ws dropping her daughter off at school, while outside, walking with her daughter pt started vomiting, feeling nauseas and had syncopal episode. By stander believed pt without pulse and unresponsive, so chest compressions initiated. Per EMS, they do not believe pt required chest compressions.  Pt in no distress at this time. Reporting pain to her chest. No active vomiting noted. Pt laughing and joking with EMS at this time. Pt AAOx4 and appropriate at this time. Respirations even and unlabored. No acute distress noted.  Pt placed on continuous cardiac monitoring.   
Urine sent to lab with a hold sticker  
No

## 2021-04-28 NOTE — BH INPATIENT PSYCHIATRY DISCHARGE NOTE - NSBHDCHANDOFFFT_PSY_ALL_CORE
Handoff provided via email to ABIGAIL Cooley and Kat Diamond at [facility] including information re: presentation, hospital course, and future treatment recommendations. Aware of how to reach Dr. Aparicio on 1S at [phone] if questions emerge.

## 2021-04-28 NOTE — BH PSYCHOLOGY - GROUP THERAPY NOTE - NSPSYCHOLGRPDBTPT_PSY_A_CORE FT
DBT Group is a group in which patients learn skills to better manage their emotions and behaviors. Group begins with a mindfulness practice so that patients have an opportunity to practice observing their internal and external experiences.  Following the mindfulness exercise the group learns new skills in a didactic format. Group today focused on the “emotion regulation” module.  Specifically, patients learned about the skill of Problem Solving, which is a method of managing difficult situations when an emotion is justified by the situation.  Patients were taught the seven steps of problem solving and provided with an example of a situation in which the skill would be useful.  Patients were also asked to think about when the skill would be helpful in their lives and how to apply the steps. Patients were also provided with a worksheet in order to help them practice the skill.
DBT skills generalization group is a group in which patients review the skill taught the day before, and patients have the opportunity to troubleshoot the skill, engage in more practice, and share their experience using the skill. Today’s skills review group focused on the skill of Accumulating Positive Emotions in the Long Term by identifying values and translating those into goals and manageable action steps. Patients shared values that are important to them and how these translate into goals, as well as how they’ve begun to implement these.  
DBT Group is a group in which patients learn skills to better manage their emotions and behaviors. Group begins with a mindfulness practice so that patients have an opportunity to practice observing their internal and external experiences.  Following the mindfulness exercise the group learns new skills in a didactic format. Group today focused on the “emotion regulation” module.  Specifically, patients learned the skill of Accumulating Positive Emotions in the Long Term by identifying values and translating those into goals and manageable action steps. Patients shared values that are important to them and how these translate into goals, as well as how they’ve begun to implement these.
DBT Group is a group in which patients learn skills to better manage their emotions and behaviors. Group begins with a mindfulness practice so that patients have an opportunity to practice observing their internal and external experiences.  Following the mindfulness exercise the group learns new skills in a didactic format. Group today focused on the “emotion regulation” module.  Specifically, patients learned Build Mastery and Ruby Ahead and Ruby Ahead. Patients were asked to identify an activity to engage in every day that would help increase their sense of competence and accomplishment (Build Mastery). They were also asked to identify potential difficult situations, identify skills to help manage these difficulties, and imagine coping effectively (Ruby Ahead).
DBT skills generalization group is a group in which patients review the skill taught the day before, and patients have the opportunity to troubleshoot the skill, engage in more practice, and share their experience using the skill. Today’s skills review group focused on the skills of “check the facts,” and “opposite action.” “Check the facts” is about being more realistic about interpretations and assumptions and challenging them appropriately to think more rationally, and “opposite action” involves acting opposite to problematic urges that come with emotions in order to change negative emotions.  encouraged patients to share examples and leader as well as fellow participants provided helpful feedback and support.  
DBT Group is a group in which patients learn skills to better manage their emotions and behaviors. Group today focused on the “mindfulness” module, which are skills to help patients increase their awareness of their present experience without judgment. Pts were taught the “what” skills (observe, describe, participate) and “how” skills (non-judgmentally, effectively, and one-mindfully) of mindfulness, and engaged in a variety of mindfulness exercises to practice the skills, and shared observations at the end of each activity. 
DBT Group is a group in which patients learn skills to better manage their emotions and behaviors. Group today focused on the “mindfulness” module, which are skills to help patients increase their awareness of their present experience without judgment. Pts were taught the “what” skills (observe, describe, participate) and “how” skills (non-judgmentally, effectively, and one-mindfully) of mindfulness, and engaged in a variety of mindfulness exercises to practice the skills, and shared observations at the end of each activity. 
DBT Group is a group in which patients learn skills to better manage their emotions and behaviors. Group begins with a mindfulness practice so that patients have an opportunity to practice observing their internal and external experiences.  Following the mindfulness exercise the group learns new skills in a didactic format. Group today focused on the “distress tolerance” module, which are skills to help patients manage their crisis urges.  Specifically, pts learned the skill of “radical acceptance,” which includes accepting painful situations in their lives they cannot change through turning the mind and practicing willingness. Patients were asked to determine difficult situations in their lives they needed to work on accepting, and provided examples of ways to practice this while in the hospital.  
DBT Group is a group in which patients learn skills to better manage their emotions and behaviors. Group begins with a mindfulness practice so that patients have an opportunity to practice observing their internal and external experiences.  Following the mindfulness exercise the group learns new skills in a didactic format. Group today focused on the “emotion regulation” module.  Specifically, patients learned the skills of “PLEASE,” or taking care of the mind by taking care of the body. This skill involves maintaining consistent treatment for physical illness, balanced eating, avoidance of mood-altering substances, balanced sleep, and exercise.  provided examples and suggestions of ways to improve these areas, and patients were encouraged to engage in discussion of ways they can prioritize and implement this skill.
DBT Group is a group in which patients learn skills to better manage their emotions and behaviors. Group begins with a mindfulness practice so that patients have an opportunity to practice observing their internal and external experiences.  Following the mindfulness exercise the group learns new skills in a didactic format. Pts were taught the concept of “wise mind,” which is about identifying different states of mind (emotional vs. reasonable mind) and finding ways to tap into wise mind which is a blend of the two, and the state of mind that is consistent with wise decision making and long term goals and values. 
DBT Group is a group in which patients learn skills to better manage their emotions and behaviors. Group begins with a mindfulness practice so that patients have an opportunity to practice observing their internal and external experiences.  Following the mindfulness exercise the group learns new skills in a didactic format. Group today focused on the “distress tolerance” module, which are skills to help patients manage their crisis urges.  Specifically, pts learned the skill of “radical acceptance,” which includes accepting painful situations in their lives they cannot change through turning the mind and practicing willingness. Patients were asked to determine difficult situations in their lives they needed to work on accepting, and provided examples of ways to practice this while in the hospital.  
DBT Group is a group in which patients learn skills to better manage their emotions and behaviors. Group begins with a mindfulness practice so that patients have an opportunity to practice observing their internal and external experiences.  Following the mindfulness exercise the group learns new skills in a didactic format. Group today focused on the “interpersonal effectiveness” module.  Specifically, patients learned “FAST” skills, which are skills to maintain self-respect in relationships through being fair to self and others, not over or under apologizing, sticking to values and being truthful. Patients watched a role play of group leaders effectively and non-effectively using skills, and were asked to reflect on use of the skills. Patients also challenged their worry thoughts that come up in interpersonal relationships by coming up with new wise mind self statements.   
DBT Group is a group in which patients learn skills to better manage their emotions and behaviors. Group begins with a mindfulness practice so that patients have an opportunity to practice observing their internal and external experiences.  Following the mindfulness exercise the group learns new skills in a didactic format. Group today focused on the “emotion regulation” module.  Specifically, patients learned the skill of Accumulating Positive Emotions in the Long Term by identifying values and translating those into goals and manageable action steps. Patients shared values that are important to them and how these translate into goals, as well as how they’ve begun to implement these.

## 2021-04-28 NOTE — BH PSYCHOLOGY - GROUP THERAPY NOTE - NSPSYCHOLGRPDBTINT_PSY_A_CORE
other...
review emotion regulation homework
review emotion regulation homework
other...
other...

## 2021-04-28 NOTE — BH PSYCHOLOGY - GROUP THERAPY NOTE - NSBHPSYCHOLGRPDUR_PSY_A_CORE
45 minutes

## 2021-04-28 NOTE — BH PSYCHOLOGY - GROUP THERAPY NOTE - NSBHPSYCHOLRESPONSE_PSY_A_CORE
Coping skills acquired/Insight displayed/Accepted support
Coping skills acquired/Insight displayed/Accepted support
Coping skills acquired/Accepted support
Coping skills acquired/Accepted support
Coping skills acquired/Insight displayed/Accepted support
Coping skills acquired/Accepted support
Coping skills acquired/Insight displayed/Accepted support

## 2021-04-28 NOTE — BH PSYCHOLOGY - GROUP THERAPY NOTE - NSBHPSYCHOLGRPNAME_PSY_A_CORE
DBT (Dialectical Behavior Therapy) Group
DBT Skills
DBT Homework
DBT Skills
DBT Skills
DBT Homework
DBT Skills

## 2021-04-28 NOTE — BH PSYCHOLOGY - GROUP THERAPY NOTE - NSPSYCHOLGRPBILLING_PSY_A_CORE
[id number] - Group Psychotherapy
[id number] - Group Psychotherapy
[id number] - Group Psychotherapy
[id number] - Group Psychotherapy
[id number] - Group Psychotherapy
[id number] - Group Psychotherapy
[id number] - Group Psychotherapy
[id number] - Group Psychotherapy
[id number] - Group Psychotherapy
[id number] - Group Psychotherapy

## 2021-04-28 NOTE — BH PSYCHOLOGY - GROUP THERAPY NOTE - NSBHPSYCHOLGRPTYPE_PSY_A_CORE
DBT Life Skills

## 2021-04-28 NOTE — BH PSYCHOLOGY - GROUP THERAPY NOTE - NSBHPTASSESSDT_PSY_A_CORE
12-Apr-2021 11:15
22-Apr-2021 11:00
27-Apr-2021 11:15
06-Apr-2021 11:00
26-Apr-2021 11:15
21-Apr-2021 09:15
23-Apr-2021 11:00
09-Apr-2021 11:15
16-Apr-2021 11:00
13-Apr-2021 11:00
05-Apr-2021 11:15
28-Apr-2021 09:15
08-Apr-2021 11:15

## 2021-04-28 NOTE — BH PSYCHOLOGY - GROUP THERAPY NOTE - NSBHPSYCHOLPARTICIP_PSY_A_CORE
Partially engaged
Partially engaged
Fully engaged
Partially engaged
Fully engaged

## 2021-04-28 NOTE — BH INPATIENT PSYCHIATRY DISCHARGE NOTE - HPI (INCLUDE ILLNESS QUALITY, SEVERITY, DURATION, TIMING, CONTEXT, MODIFYING FACTORS, ASSOCIATED SIGNS AND SYMPTOMS)
Patient was seen and evaluated, chart reviewed. Case discussed with nursing team.  On service for this 21 yr old  female, single, domiciled and full time college student. Has past hx of Bipolar Disorder, with psychotic features. Patient is hospitalized with a primary problem of anson, psychotic decompensation in context of medication noncompliance.  Patient admitted to [facility] on 9.39 legal status. I have reviewed the initial psychiatric assessment in the electronic medical record, including the history of present illness, past psychiatric history, family/social history (no pertinent changes), and exam, and have confirmed the salient findings dated 4/1/21.    As per chart review, transferring records indicated the following:  The Pt is 21 yr old  female, single, domiciled and full time college student (currently on remote module).  Has past hx of bipolar disorder, 2 prior [facility] admissions, hx of noncompliance to meds, no community psychiatrist but has an campus counsellor; no hx of SA or self injurious behaviors and no substance abuse hx.  Today, self presented to the ED BIB parents upon referral from our [facility] as Pt earlier presented to the [facility] for evaluation of anson.    She is seen bedside.  Pt is labile but redirectable.  Claims that she has been experiencing a hard time with her online classes.  She reports being "forced to learn".  Noted to intermittently cry then laugh.  Began to cry when she told about how her parents were making her feel bad with regards to assuming all school expenses (including parents paying up for her tuition fees).  Says that she was told by parents how they are "suffering for her".  Pt tells writer that with these statements made by her parents, she feels that "something is off".  Pt reports that parents are constantly lying to her about everything.  She believes that mother is "worshipping money over god".  Pt then switched topic and was noted to laugh as she says that today is April fools day.  She claims to see "birds laughing at us (her including this writer)."  She reports that the birds are communicating with her.. That they (the birds) are happy and excited.  She then made odd gestures during this evaluation,  expressing that she sends messages to the birds.   Apart from getting messages from birds, she also admits hearing the voice of god.  She claims to be getting Cheondoism messages from god.  Pt believes that she is the "vessel of the holy spirit".  Thinks that she is "the chosen one" - however, also feels that "something is off".  Pt was unable to further elaborate on what this implied.  Apart from believing that she is "the chosen one", Pt is also convinced that "Carlos is ok but Sandra is not" (in reference to Carlos and Sandra - from the bible).   Pt says that "nothing is wrong with me".  Describes her mood as "heavenly".  Admits that she has not gotten enough sleep for 1 week now.  says that she does not feel tired at all.  "I feel perfectly fine", she says.  Reports that she is not taking any medications - "it messes me up".  Pt denied feeling depressed.  does not endorse to experiencing any symptoms suggestive of MDD/ vegetative symptoms.  does not harbor any passive/ active SI or HI. "superficially admitted" that she felt anxious but did not make any reference to any particular individual/ organization.  Denied experiencing any specific anxiety disorder symptoms.    COLLATERAL INFORMATION WAS OBTAINED FROM HER PARENTS: who both reported that the Pt after her last [facility] discharge (back in 8/2019), has not been taking any psych meds.  Parents both claim that the Pt has been agitated, labile and not sleeping for the past 6 days now.  She has also been reported to act "strangely", e.g. "hiding from parents" as she does not trust them.  IN addition, noted to be increasingly hyper Cheondoism and "philosophical".  Father describes Pt as "very emotional" - crying often then laughing.  Stressors endorsed by parents include: Pt is stressed from school work and has been "trapped at home" - since the pandemic.  Pt has told them that she (the Pt) is feeling "perfectly fine".  No reported verbalization of SI or HI.  NO aggression/ violence. Today, due to worsening symptoms, parents decided to bring Pt to our [facility].    COLLATERAL FROM DR FRANCISCO JAVIER ALONSO: who saw Pt at our [facility].. noted to be disorganized, threw water whilst at the center.  Was too disorganized to partake fully in the evaluation.  He encouraged Pt, parents to come to our ED for further evaluation and management.    On unit:  Information Received From: Chart review and patient interview  CC: “There’s nothing wrong with me "   Patient is followed up for Bipolar Disorder, with psychotic decompensation.  Chart, medications and labs reviewed.  Patient is discussed with nursing staff. No significant overnight issues.  Patient is observed in her room notably guarded upon approach, appears suspicious of writer, a bit hesitant to engage in interview. When questioned about her mood and psychotic symptoms, pt reports “there’s nothing wrong with me.”  Patient refused morning medications with Abilify 5mg, states “my mood is fine I don’t need medications.”   Patient presents profoundly disorganized, with ongoing perceptual disturbances, made references of birds talking to her. During interview patient smiles/laughs to herself.  Denies SI/HI, denies all psychotic features, despite psychotic symptoms successfully elicited. Patient is informed she will be transferred to [address] [place] [address].

## 2021-04-28 NOTE — BH INPATIENT PSYCHIATRY DISCHARGE NOTE - NSBHMETABOLIC_PSY_ALL_CORE_FT
BMI: BMI (kg/m2): 18.4 (04-02-21 @ 00:22)  HbA1c: A1C with Estimated Average Glucose Result: 5.5 % (04-14-21 @ 09:28)    Glucose:   BP: 116/87 (04-27-21 @ 08:10) (116/87 - 128/88)  Lipid Panel: Date/Time: 04-14-21 @ 09:28  Cholesterol, Serum: 142  Direct LDL: --  HDL Cholesterol, Serum: 68  Total Cholesterol/HDL Ration Measurement: --  Triglycerides, Serum: 62

## 2021-04-28 NOTE — BH PSYCHOLOGY - GROUP THERAPY NOTE - NSPSYCHOLGRPDBTEMOT_PSY_A_CORE FT
Accumulating Positives: Long Term  
-
na
na  
Problem Solving 
Build Mastery / Lima Ahead  
PLEASE  
accumulating positive emotions in the long term
Accumulating Positives: Long Term  
opposite action
na

## 2021-04-28 NOTE — BH INPATIENT PSYCHIATRY DISCHARGE NOTE - DESCRIPTION
Single, only child. biological father living in [place]. Pt born in [place] and reports she migrated to [place] at age 9.  Mother remarried and current, Pt lives with mother and step father.  Step father is reportedly a Professor in Biology teaching at [employer]; mother is a soft ware .  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  She reports being interested in Languages.  Likes dancing, drawing, composing music.  she is not Yazidism

## 2021-04-28 NOTE — BH PSYCHOLOGY - GROUP THERAPY NOTE - NSPSYCHOLGRPDBTPT_PSY_A_CORE
stable mood and affect in group/patient showing good behavior control/Patient able to identify mood states/other...
stable mood and affect in group/patient showing good behavior control/Patient able to identify mood states/other...
stable mood and affect in group/no self-destructive impulses/behaviors/other...
stable mood and affect in group/other...
stable mood and affect in group/no self-destructive impulses/behaviors/other...
stable mood and affect in group/patient showing good behavior control/Patient able to identify mood states/other...
stable mood and affect in group/no self-destructive impulses/behaviors/other...
stable mood and affect in group/other...

## 2021-04-28 NOTE — BH INPATIENT PSYCHIATRY PROGRESS NOTE - NSBHFUPINTERVALHXFT_PSY_A_CORE
Chart reviewed. Case discussed with interdisciplinary team. Patient seen and examined for follow up of anson and psychosis. No acute events overnight. Per report, pt is taking better care of ADLs, attending groups. Took medications, no PRNs. Per sleep log, slept 10p-5:30a.     On interview pt reports feeling good today, with good mood enoying groups and outdoor time. Regarding sleep reports sleeping well overnight, with good quality sleep despite waking early. Reports energy as good this morning. Denies nausea, constipation or diarrhea or vomiting. Reports sustained improvement in appetite and PO intake-eating at all meals and asking for ensure if preferred foods not available.     Reports clear thoughts. Denies AVH. Denies ideas of reference. Denies paranoid ideation, no grandiosity. Denies SI/HI. Talks about looking forward to returning home, but enjoyment of community of friends and activities in the hospital. Also has enjoyed less screen time and wants to continue this on going home. Denies worries or anxiety about discharge-brainstormed how to interact effectively with parents. Also feels like she can use skills to manage her mood and anxiety and get along w/ her parents. Plans to take medications after meals to help her remember.     No other physical complaints or c/f medication side effects. shaking/stiffness is significantly reduced. Restlessness also reduced today. No tremor.

## 2021-04-28 NOTE — BH INPATIENT PSYCHIATRY PROGRESS NOTE - MSE UNSTRUCTURED FT
MSE:  MAGGIE/BEH: Adult female in no acute distress; dressed appropriately but mildly disheveled appearing; calm & cooperative; fair eye contact and relatedness  SPEECH: normal rate and volume, monotone.   MOTOR: No PMA/PMR; appears less postured, more reactive, no tremor, no other abnormal movements including TD. no rigidity  MOOD: “good”.   AFFECT: euthymic, and mood congruent; increased range.   THOUGHT PROCESS: Linear, logical, and goal-directed.   THOUGHT CONTENT: Focused on treatment plan. Denies SI/HI; denies AVH; no IoR or eran delusions  COGNITION: Grossly intact.   INSIGHT: limited   JUDGMENT: limited to fair related to treatment  IC: intact

## 2021-04-28 NOTE — BH PSYCHOLOGY - GROUP THERAPY NOTE - NSPSYCHOLGRPDBTPROB_PSY_A_CORE
labile affect
psychotic episodes
labile affect
psychotic episodes

## 2021-04-28 NOTE — BH INPATIENT PSYCHIATRY DISCHARGE NOTE - HOSPITAL COURSE
Dates of hospitalization: 4/1/2021-4/29/2021    On admission interview, patient presented with the following signs and symptoms:   Affective lability, “heavenly” mood, flight of ideas, ideas of reference (getting communications from/messages from birds), Temple preoccupation and delusion (hearing the voice of God, being the vessel of the holy spirit, being the chosen one), decreased need for sleep x 1 week. On initial inpatient interview on [facility] (prior to transfer to ) patient was guarded and paranoid, refusing medications, reporting birds were talking to her and appearing internally preoccupied.     The patient’s parents reported a long period of medication compliance, followed by 6 days of increased agitation, insomnia and mood lability, as well as paranoia, hyper-Temple thought content. In crisis clinic she was noted to be with disorganized thought process and behavior and thew water.    Dx impression was Bipolar I Disorder, current episode manic and psychotic; consistent with prior presentations that precipitated hospitalization.    Patient was re-started on Lithium for mood stabilization, which was titrated to 600 mg qHS with good effect and tolerability. Last level on 4/22 was 0.9 with stable BMP and TSH. Ativan 1 mg HS was started at night for sleep with good effect and tolerability. Abilify was re-started for psychosis. This was titrated to 15 mg daily with good clinical effect however patient began to experience side effects including EPS (parkinsonism with bradykinesia, bradyphrenia, flat facies and lower extremity rigidity) and akathisia. Pt was started on propranolol 10 mg BID for akathisia and Cogentin 1 mg BID for EPS while slow taper of abilify was begun. Tapered abilify to 5 mg-at which point sleep became disturbed again and some Temple preoccupation returned. Patient then began to refuse abilify given concern about side effects and overall ambivalence about medications. Was willing to start a trial of Risperdal 1 mg for sleep and psychosis. Pt had some initial nausea when starting Risperdal but this resolved. Risperdal did help with sleep and likely prevented return of full delusions. However, at time of discharge patient remains with some over-valued and near referential ideas about Bahai. She refused a dose increase of risperdal. Propranolol was tapered off w/o return of akathisia and Cogentin was decreased to 0.5 mg BID. On final regimen patient continued to present with some PMR but no tremor, TD, or rigidity. Parents remained concern about PMR and perceived tremor.      Patient’s symptoms gradually improved over the course of the hospitalization. At time of discharge, patient is presenting with euthymic and stable mood, some decreased affective reactivity but showing full range, linear and logical thought process. She is denying grandiosity, racing thoughts, rapid speech, irritability. She continues to feel anxious around her parents but denies eran paranoid delusions. Also denies AVH or eran referential delusions. What persists is her description of herself as “spiritual,” deriving meaning from seemingly randomly selected bible passages. Also noted with some use of gestures while she or MD is talking (almost acting out the thought content). She consistently denies passive/active SI, VI/HI. At discharge Brielle remains somewhat disheveled but with adequate appetite and PO intake, sleeping better and showering.     There were no behavioral problems on the unit.  Patient did not become agitated and did not require emergent intramuscular medications or seclusion/restraints.  Patient did not self-harm on the unit. Patient remained actively engaged in treatment. Patient participated in group and milieu therapy. Patient got along appropriately with staff and peers. Family was contacted at time of admission to obtain collateral, provide psychoeducation, and collaboratively treatment plan.  A family meeting was held prior to discharge for safety planning and disposition planning.  Patient did not have any medical problems during this hospitalization.  There were no medical consultations.     On day of discharge, the patient has improved significantly, as described above, and no longer requires inpatient treatment and care. Patient denies all suicidal and aggressive ideation, intent and plan. Patient displays significant improvement in psychotic symptoms and is w/o manic or acute depressive symptoms. Patient is not judged to be an acute danger to self or others at this time.    Risk Assessment:   Brielle is at chronic risk of harm to self and others/inability to care for self given history of Bipolar I Disorder, multiple psychiatric hospitalizations, and medication non-compliance. Chronic protective factors include lack of h/o SI, SA, NSSIB, substance use, trauma, legal issues or violence.     On admission the patient was felt to be at an acutely elevated risk of harm to others/inability to care for self as she presented with affective lability, decreased need for sleep, delusions and hallucinations, paranoia, and poor attention to ADLs. Over the hospital course anson and psychosis were addressed via medication management, group and milieu therapy. Mood stabilized to largely euthymic, as described above, and psychotic symptoms reduced to some continued minimal Temple based ideas of reference but not full delusions or delusions that are influencing behavior in a way that is dangerous for the patient. She consistently denies passive/active SI, VI/HI and is attending to ADLs adequately. She is future oriented to treatment, improving relationship with parents and continuing to increase socialization.    Given the improvements described above, the patient is no longer felt to be at an acutely elevated risk of harm to self/others or inability to care for self and therefore no longer requires inpatient hospitalization. She is stable for transition to outpatient level of care.    Patient will be discharged with the following DSM5 diagnoses:  1. Bipolar I Disorder, mre manic with psychotic features    Patient will be discharged on the following medications:  Lithium 600 mg HS (last level 4/22 0.9)  Risperdal 1 mg HS  Ativan 1 mg HS  Cogentin 0.5 mg BID

## 2021-04-28 NOTE — BH INPATIENT PSYCHIATRY DISCHARGE NOTE - NSBHDCMEDICALFT_PSY_A_CORE
Patient did not have any medical problems during this hospitalization.  There were no medical consultations.

## 2021-04-28 NOTE — BH INPATIENT PSYCHIATRY PROGRESS NOTE - NSBHASSESSSUMMFT_PSY_ALL_CORE
ASSESSMENT:  21 yr old  female, single, domiciled w/ parents, full-time college student at [employer] (currently on remote module), w/ PPH of bipolar disorder, 2 prior [facility] admissions (last on 1S on 9/17-8/27/2019), hx of noncompliance to meds, and no community psychiatrist but has an campus counsellor; no hx of SA or self-injurious behaviors and no substance abuse hx, who presented to the ED on 4/1 BIB parents upon referral from our [facility] ED for evaluation of anson.    Working diagnosis: Bipolar 1 Disorder, current episode manic with psychotic features    Currently, patient in good behavioral control, reports good mood, with improved anson. Sikhism focus persists but today w/o ideas of reference or clear delusions. Sleep remains improved, with improved medication tolerance (nausea resolved.) Today with fair insight into need for medications to assist with treatment (not just behavioral choices) and increase independence from parents. Continue to monitor EPS and fine tremor-much improved since d/c of abilify.    Continued inpatient admission required for stabilization and safe discharge planning.      PLAN:  1. Legal: Admitted on 9.39 status, converted to C  2. Safety: No reported SI/SIB/HI/VI currently on unit; continue routine observation.   -psychotropic PRN medications: haldol 5mg q6 PRN psychotic agitation; lorazepam 2 mg PO q6 hr PRN agitation; melatonin 3 mg PO QHS PRN insomnia  3. Psychiatric:   - continue trial of risperdal 1 mg HS for psychosis and with aim to also help with sleep; tolerating better; pt does not want to increase dose  - continue cogentin to 0.5 mg BID for EPS-no stiffness today  - Continue Lithium 600 mg PO QHS for bipolar anson. lithium level 4/22 0.9   - Continue Ativan 1 mg PO QHS for anson/insomnia.   4. Therapy: group & milieu therapy  5. Medical: nutrition c/s completed; monitor appetite and PO intake as well as report of nausea and diarrhea  -labs today notable for K of 3.4 but otherwise CMP and TSH wnl  6. Collateral: Spoke to Dad 4/22 and again 4/26--family feels pt is doing well overall and better than admission, although they still remain c/f medication side effects. FM held yesterday  7. Disposition: When stable, outpatient services via [facility]; later this week if gains maintained

## 2021-04-28 NOTE — BH PSYCHOLOGY - GROUP THERAPY NOTE - NSBHPSYCHOLASSESSPROV_PSY_A_CORE
Licensed Staff Psychologist
Licensed Staff Psychologist and Trainee
Licensed Staff Psychologist
Licensed Staff Psychologist and Trainee
Trainee Only
Licensed Staff Psychologist
Trainee Only
Licensed Staff Psychologist and Trainee
Licensed Staff Psychologist
Licensed Staff Psychologist
Trainee Only
Licensed Staff Psychologist
Licensed Staff Psychologist and Trainee

## 2021-04-28 NOTE — BH INPATIENT PSYCHIATRY DISCHARGE NOTE - NSDCCPCAREPLAN_GEN_ALL_CORE_FT
PRINCIPAL DISCHARGE DIAGNOSIS  Diagnosis: Bipolar I disorder, most recent episode manic, severe with psychotic features  Assessment and Plan of Treatment:

## 2021-04-29 VITALS — TEMPERATURE: 97 F | SYSTOLIC BLOOD PRESSURE: 108 MMHG | HEART RATE: 77 BPM | DIASTOLIC BLOOD PRESSURE: 69 MMHG

## 2021-04-29 RX ADMIN — Medication 0.5 MILLIGRAM(S): at 08:39

## 2021-04-29 NOTE — BH INPATIENT PSYCHIATRY PROGRESS NOTE - NSTXMANICINTERMD_PSY_ALL_CORE
Abilify / lithium 
Abilify / lithium 
risperdal / lithium 
Abilify / lithium 
risperdal / lithium 
Abilify / lithium 
risperdal / lithium 
risperdal / lithium 
Abilify / lithium 
risperdal / lithium

## 2021-04-29 NOTE — BH INPATIENT PSYCHIATRY PROGRESS NOTE - NSTXDISORGINTERMD_PSY_ALL_CORE
Lithium 600mgqhs, tapering abilify to find lowest therapeutic dose to minimize akathesia and EPS
Abilify 5mg daily  and Lithium 600mgqhs
titrating Abilify,  and Lithium 600mgqhs
Lithium 600mgqhs, trial of risperdal
titrating Abilify,  and Lithium 600mgqhs
Abilify 5mg daily  and Lithium 600mgqhs
Lithium 600mgqhs, tapering abilify to find lowest therapeutic dose to minimize akathesia and EPS
Abilify 5mg daily  and Lithium 600mgqhs
Lithium 600mgqhs, trial of risperdal
titrating Abilify,  and Lithium 600mgqhs
Lithium 600mgqhs, tapering abilify to find lowest therapeutic dose to minimize akathesia and EPS
Lithium 600mgqhs, trial of risperdal
Lithium 600mgqhs, tapering abilify to find lowest therapeutic dose to minimize akathesia and EPS
Abilify 5mg daily  and Lithium 600mgqhs
Lithium 600mgqhs, tapering abilify to find lowest therapeutic dose to minimize akathesia and EPS
Lithium 600mgqhs, trial of risperdal
Lithium 600mgqhs, trial of risperdal
Abilify 5mg daily  and Lithium 600mgqhs
Abilify 5mg daily  and Lithium 600mgqhs

## 2021-04-29 NOTE — BH INPATIENT PSYCHIATRY PROGRESS NOTE - MSE OPTIONS
Unstructured MSE
Structured MSE
Unstructured MSE
Structured MSE

## 2021-04-29 NOTE — BH INPATIENT PSYCHIATRY PROGRESS NOTE - NSBHMETABOLICLABS_PSY_ALL_CORE
All labs not within last 12 months, ordered
Labs within last 12 months
All labs not within last 12 months, ordered

## 2021-04-29 NOTE — BH INPATIENT PSYCHIATRY PROGRESS NOTE - NSBHMETABOLIC_PSY_ALL_CORE_FT
BMI: BMI (kg/m2): 18.4 (04-02-21 @ 00:22)  HbA1c: A1C with Estimated Average Glucose Result: 5.5 % (04-14-21 @ 09:28)    Glucose:   BP: 105/76 (04-15-21 @ 06:53) (94/63 - 122/73)  Lipid Panel: Date/Time: 04-14-21 @ 09:28  Cholesterol, Serum: 142  Direct LDL: --  HDL Cholesterol, Serum: 68  Total Cholesterol/HDL Ration Measurement: --  Triglycerides, Serum: 62  
BMI: BMI (kg/m2): 18.4 (04-02-21 @ 00:22)  HbA1c: A1C with Estimated Average Glucose Result: 5.5 % (04-14-21 @ 09:28)    Glucose:   BP: 108/69 (04-29-21 @ 06:35) (108/69 - 116/87)  Lipid Panel: Date/Time: 04-14-21 @ 09:28  Cholesterol, Serum: 142  Direct LDL: --  HDL Cholesterol, Serum: 68  Total Cholesterol/HDL Ration Measurement: --  Triglycerides, Serum: 62  
BMI: BMI (kg/m2): 18.4 (04-02-21 @ 00:22)  HbA1c: A1C with Estimated Average Glucose Result: 5.5 % (04-14-21 @ 09:28)    Glucose:   BP: 127/62 (04-25-21 @ 20:49) (96/63 - 127/62)  Lipid Panel: Date/Time: 04-14-21 @ 09:28  Cholesterol, Serum: 142  Direct LDL: --  HDL Cholesterol, Serum: 68  Total Cholesterol/HDL Ration Measurement: --  Triglycerides, Serum: 62  
BMI: BMI (kg/m2): 18.4 (04-02-21 @ 00:22)  HbA1c: A1C with Estimated Average Glucose Result: 5.5 % (04-14-21 @ 09:28)    Glucose:   BP: 94/63 (04-14-21 @ 06:18) (94/63 - 122/73)  Lipid Panel: Date/Time: 04-14-21 @ 09:28  Cholesterol, Serum: 142  Direct LDL: --  HDL Cholesterol, Serum: 68  Total Cholesterol/HDL Ration Measurement: --  Triglycerides, Serum: 62  
BMI: BMI (kg/m2): 18.4 (04-02-21 @ 00:22)  HbA1c:   Glucose:   BP: 105/68 (04-13-21 @ 07:55) (105/68 - 121/81)  Lipid Panel: Date/Time: 04-02-21 @ 09:33  Cholesterol, Serum: 105  Direct LDL: --  HDL Cholesterol, Serum: 61  Total Cholesterol/HDL Ration Measurement: --  Triglycerides, Serum: 38  
BMI: BMI (kg/m2): 18.4 (04-02-21 @ 00:22)  HbA1c: A1C with Estimated Average Glucose Result: 5.5 % (04-14-21 @ 09:28)    Glucose:   BP: 117/68 (04-21-21 @ 07:45) (114/67 - 118/87)  Lipid Panel: Date/Time: 04-14-21 @ 09:28  Cholesterol, Serum: 142  Direct LDL: --  HDL Cholesterol, Serum: 68  Total Cholesterol/HDL Ration Measurement: --  Triglycerides, Serum: 62  
BMI: BMI (kg/m2): 18.4 (04-02-21 @ 00:22)  HbA1c: A1C with Estimated Average Glucose Result: 5.5 % (04-14-21 @ 09:28)    Glucose:   BP: 116/87 (04-27-21 @ 08:10) (96/63 - 128/88)  Lipid Panel: Date/Time: 04-14-21 @ 09:28  Cholesterol, Serum: 142  Direct LDL: --  HDL Cholesterol, Serum: 68  Total Cholesterol/HDL Ration Measurement: --  Triglycerides, Serum: 62  
BMI: BMI (kg/m2): 18.4 (04-02-21 @ 00:22)  HbA1c:   Glucose:   BP: 106/73 (04-09-21 @ 08:41) (105/71 - 119/87)  Lipid Panel: Date/Time: 04-02-21 @ 09:33  Cholesterol, Serum: 105  Direct LDL: --  HDL Cholesterol, Serum: 61  Total Cholesterol/HDL Ration Measurement: --  Triglycerides, Serum: 38  
BMI: BMI (kg/m2): 18.4 (04-02-21 @ 00:22)  HbA1c: A1C with Estimated Average Glucose Result: 5.5 % (04-14-21 @ 09:28)    Glucose:   BP: 114/67 (04-19-21 @ 06:20) (107/69 - 122/82)  Lipid Panel: Date/Time: 04-14-21 @ 09:28  Cholesterol, Serum: 142  Direct LDL: --  HDL Cholesterol, Serum: 68  Total Cholesterol/HDL Ration Measurement: --  Triglycerides, Serum: 62  
BMI: BMI (kg/m2): 18.4 (04-02-21 @ 00:22)  HbA1c: A1C with Estimated Average Glucose Result: 5.5 % (04-14-21 @ 09:28)    Glucose:   BP: 111/79 (04-22-21 @ 06:42) (111/79 - 118/87)  Lipid Panel: Date/Time: 04-14-21 @ 09:28  Cholesterol, Serum: 142  Direct LDL: --  HDL Cholesterol, Serum: 68  Total Cholesterol/HDL Ration Measurement: --  Triglycerides, Serum: 62  
BMI: BMI (kg/m2): 18.4 (04-02-21 @ 00:22)  HbA1c: A1C with Estimated Average Glucose Result: 5.5 % (04-14-21 @ 09:28)    Glucose:   BP: 118/87 (04-20-21 @ 06:04) (107/69 - 122/82)  Lipid Panel: Date/Time: 04-14-21 @ 09:28  Cholesterol, Serum: 142  Direct LDL: --  HDL Cholesterol, Serum: 68  Total Cholesterol/HDL Ration Measurement: --  Triglycerides, Serum: 62  
BMI: BMI (kg/m2): 18.4 (04-02-21 @ 00:22)  HbA1c:   Glucose:   BP: 123/82 (04-03-21 @ 06:46) (102/62 - 149/93)  Lipid Panel: Date/Time: 04-02-21 @ 09:33  Cholesterol, Serum: 105  Direct LDL: --  HDL Cholesterol, Serum: 61  Total Cholesterol/HDL Ration Measurement: --  Triglycerides, Serum: 38  
BMI: BMI (kg/m2): 18.4 (04-02-21 @ 00:22)  HbA1c:   Glucose:   BP: 127/70 (04-05-21 @ 23:36) (117/74 - 127/70)  Lipid Panel: Date/Time: 04-02-21 @ 09:33  Cholesterol, Serum: 105  Direct LDL: --  HDL Cholesterol, Serum: 61  Total Cholesterol/HDL Ration Measurement: --  Triglycerides, Serum: 38  
BMI: BMI (kg/m2): 18.4 (04-02-21 @ 00:22)  HbA1c:   Glucose:   BP: 123/82 (04-03-21 @ 06:46) (102/62 - 149/93)  Lipid Panel: Date/Time: 04-02-21 @ 09:33  Cholesterol, Serum: 105  Direct LDL: --  HDL Cholesterol, Serum: 61  Total Cholesterol/HDL Ration Measurement: --  Triglycerides, Serum: 38  
BMI: BMI (kg/m2): 18.4 (04-02-21 @ 00:22)  HbA1c: A1C with Estimated Average Glucose Result: 5.5 % (04-14-21 @ 09:28)    Glucose:   BP: 116/87 (04-27-21 @ 08:10) (116/87 - 128/88)  Lipid Panel: Date/Time: 04-14-21 @ 09:28  Cholesterol, Serum: 142  Direct LDL: --  HDL Cholesterol, Serum: 68  Total Cholesterol/HDL Ration Measurement: --  Triglycerides, Serum: 62  
BMI: BMI (kg/m2): 18.4 (04-02-21 @ 00:22)  HbA1c:   Glucose:   BP: 106/72 (04-12-21 @ 06:21) (106/72 - 121/81)  Lipid Panel: Date/Time: 04-02-21 @ 09:33  Cholesterol, Serum: 105  Direct LDL: --  HDL Cholesterol, Serum: 61  Total Cholesterol/HDL Ration Measurement: --  Triglycerides, Serum: 38  
BMI: BMI (kg/m2): 18.4 (04-02-21 @ 00:22)  HbA1c: A1C with Estimated Average Glucose Result: 5.5 % (04-14-21 @ 09:28)    Glucose:   BP: 118/72 (04-16-21 @ 07:48) (94/63 - 122/73)  Lipid Panel: Date/Time: 04-14-21 @ 09:28  Cholesterol, Serum: 142  Direct LDL: --  HDL Cholesterol, Serum: 68  Total Cholesterol/HDL Ration Measurement: --  Triglycerides, Serum: 62  
BMI: BMI (kg/m2): 18.4 (04-02-21 @ 00:22)  HbA1c: A1C with Estimated Average Glucose Result: 5.5 % (04-14-21 @ 09:28)    Glucose:   BP: 113/77 (04-23-21 @ 06:59) (111/79 - 117/68)  Lipid Panel: Date/Time: 04-14-21 @ 09:28  Cholesterol, Serum: 142  Direct LDL: --  HDL Cholesterol, Serum: 68  Total Cholesterol/HDL Ration Measurement: --  Triglycerides, Serum: 62  
BMI: BMI (kg/m2): 18.4 (04-02-21 @ 00:22)  HbA1c:   Glucose:   BP: 105/71 (04-07-21 @ 07:52) (105/71 - 127/70)  Lipid Panel: Date/Time: 04-02-21 @ 09:33  Cholesterol, Serum: 105  Direct LDL: --  HDL Cholesterol, Serum: 61  Total Cholesterol/HDL Ration Measurement: --  Triglycerides, Serum: 38  
BMI: BMI (kg/m2): 18.4 (04-02-21 @ 00:22)  HbA1c:   Glucose:   BP: 117/74 (04-05-21 @ 07:48) (117/74 - 123/82)  Lipid Panel: Date/Time: 04-02-21 @ 09:33  Cholesterol, Serum: 105  Direct LDL: --  HDL Cholesterol, Serum: 61  Total Cholesterol/HDL Ration Measurement: --  Triglycerides, Serum: 38  
BMI: BMI (kg/m2): 18.4 (04-02-21 @ 00:22)  HbA1c:   Glucose:   BP: 119/87 (04-08-21 @ 06:45) (105/71 - 127/70)  Lipid Panel: Date/Time: 04-02-21 @ 09:33  Cholesterol, Serum: 105  Direct LDL: --  HDL Cholesterol, Serum: 61  Total Cholesterol/HDL Ration Measurement: --  Triglycerides, Serum: 38

## 2021-04-29 NOTE — BH INPATIENT PSYCHIATRY PROGRESS NOTE - NSBHLEGALSTATUSCHANGE_PSY_ALL_CORE
No
Yes...
Yes...
No
Yes...
No
No
Yes...
No
No
Yes...
No
No
Yes...
No
Yes...
Yes...

## 2021-04-29 NOTE — BH INPATIENT PSYCHIATRY PROGRESS NOTE - NSBHANTIPSYCHOTIC_PSY_ALL_CORE
Yes...

## 2021-04-29 NOTE — BH INPATIENT PSYCHIATRY PROGRESS NOTE - NSTXMEDICGOAL_PSY_ALL_CORE
Be able to state dosages and times to take medications
Take all medications as prescribed
Be able to state dosages and times to take medications
Take all medications as prescribed
Be able to state dosages and times to take medications
Take all medications as prescribed
Be able to state dosages and times to take medications
Take all medications as prescribed
Be able to state dosages and times to take medications
Be able to state dosages and times to take medications

## 2021-04-29 NOTE — BH INPATIENT PSYCHIATRY PROGRESS NOTE - NSICDXBHPRIMARYDX_PSY_ALL_CORE
Bipolar disorder   F31.9  

## 2021-04-29 NOTE — BH INPATIENT PSYCHIATRY PROGRESS NOTE - NSTXPROBDISORG_PSY_ALL_CORE
DISORGANIZATION OF THOUGHT/BEHAVIOR

## 2021-04-29 NOTE — BH INPATIENT PSYCHIATRY PROGRESS NOTE - NS ED [facility] MED ROS PSYCHIATRIC
See HPI

## 2021-04-29 NOTE — BH INPATIENT PSYCHIATRY PROGRESS NOTE - NSTXMEDICPROGRES_PSY_ALL_CORE
Improving

## 2021-04-29 NOTE — BH INPATIENT PSYCHIATRY PROGRESS NOTE - NSBHLEGALSTATUSDATE_PSY_ALL_CORE
15-Apr-2021

## 2021-04-29 NOTE — BH INPATIENT PSYCHIATRY PROGRESS NOTE - NSTXMANICDATETRGT_PSY_ALL_CORE
29-Apr-2021
15-Apr-2021
09-Apr-2021
22-Apr-2021
22-Apr-2021
29-Apr-2021
15-Apr-2021
22-Apr-2021
09-Apr-2021
29-Apr-2021
09-Apr-2021
22-Apr-2021
15-Apr-2021
29-Apr-2021
09-Apr-2021
22-Apr-2021
15-Apr-2021
15-Apr-2021
09-Apr-2021

## 2021-04-29 NOTE — BH INPATIENT PSYCHIATRY PROGRESS NOTE - NSTXDISORGDATETRGT_PSY_ALL_CORE
29-Apr-2021
09-Apr-2021
22-Apr-2021
29-Apr-2021
22-Apr-2021
15-Apr-2021
22-Apr-2021
09-Apr-2021
15-Apr-2021
09-Apr-2021
15-Apr-2021
09-Apr-2021
15-Apr-2021
29-Apr-2021
09-Apr-2021
29-Apr-2021
15-Apr-2021

## 2021-04-29 NOTE — BH INPATIENT PSYCHIATRY PROGRESS NOTE - NSTXDCOPNODATEEST_PSY_ALL_CORE
12-Apr-2021
26-Apr-2021
02-Apr-2021
02-Apr-2021
05-Apr-2021
12-Apr-2021
05-Apr-2021
12-Apr-2021
02-Apr-2021
02-Apr-2021
26-Apr-2021
12-Apr-2021
12-Apr-2021
26-Apr-2021
02-Apr-2021
05-Apr-2021
26-Apr-2021
05-Apr-2021
05-Apr-2021

## 2021-04-29 NOTE — BH INPATIENT PSYCHIATRY PROGRESS NOTE - NSTXPROBDCOPNO_PSY_ALL_CORE
DISCHARGE ISSUE - NON-ADHERENT WITH OUTPATIENT SERVICES

## 2021-04-29 NOTE — BH INPATIENT PSYCHIATRY PROGRESS NOTE - NSBHROSSYSTEMS_PSY_ALL_CORE
Gastrointestinal...
Gastrointestinal...
Musculoskeletal...
Gastrointestinal...
Musculoskeletal...
Musculoskeletal...
Gastrointestinal...
Gastrointestinal...

## 2021-04-29 NOTE — BH INPATIENT PSYCHIATRY PROGRESS NOTE - CURRENT MEDICATION
MEDICATIONS  (STANDING):  ARIPiprazole 10 milliGRAM(s) Oral daily  benztropine 1 milliGRAM(s) Oral two times a day  lithium 600 milliGRAM(s) Oral at bedtime  LORazepam     Tablet 1 milliGRAM(s) Oral at bedtime  propranolol 10 milliGRAM(s) Oral two times a day    MEDICATIONS  (PRN):  haloperidol     Tablet 5 milliGRAM(s) Oral every 6 hours PRN psychotic agitation  haloperidol    Injectable 5 milliGRAM(s) IntraMuscular every 6 hours PRN SEVERE PSYCHOTIC AGITATION  LORazepam     Tablet 2 milliGRAM(s) Oral every 6 hours PRN Agitation  LORazepam   Injectable 2 milliGRAM(s) IntraMuscular every 6 hours PRN SEVERE AGITATION  melatonin. 3 milliGRAM(s) Oral at bedtime PRN Insomnia  
MEDICATIONS  (STANDING):  ARIPiprazole 10 milliGRAM(s) Oral daily  lithium 600 milliGRAM(s) Oral at bedtime    MEDICATIONS  (PRN):  haloperidol     Tablet 5 milliGRAM(s) Oral every 6 hours PRN psychotic agitation  haloperidol    Injectable 5 milliGRAM(s) IntraMuscular every 6 hours PRN SEVERE PSYCHOTIC AGITATION  LORazepam     Tablet 2 milliGRAM(s) Oral every 6 hours PRN Agitation  LORazepam   Injectable 2 milliGRAM(s) IntraMuscular every 6 hours PRN SEVERE AGITATION  melatonin. 3 milliGRAM(s) Oral at bedtime PRN Insomnia  
MEDICATIONS  (STANDING):  ARIPiprazole 10 milliGRAM(s) Oral daily  lithium 600 milliGRAM(s) Oral at bedtime  LORazepam     Tablet 0.5 milliGRAM(s) Oral two times a day    MEDICATIONS  (PRN):  haloperidol     Tablet 5 milliGRAM(s) Oral every 6 hours PRN psychotic agitation  haloperidol    Injectable 5 milliGRAM(s) IntraMuscular every 6 hours PRN SEVERE PSYCHOTIC AGITATION  LORazepam     Tablet 2 milliGRAM(s) Oral every 6 hours PRN Agitation  LORazepam   Injectable 2 milliGRAM(s) IntraMuscular every 6 hours PRN SEVERE AGITATION  melatonin. 3 milliGRAM(s) Oral at bedtime PRN Insomnia  
MEDICATIONS  (STANDING):  ARIPiprazole 5 milliGRAM(s) Oral daily  lithium 600 milliGRAM(s) Oral at bedtime    MEDICATIONS  (PRN):  haloperidol     Tablet 5 milliGRAM(s) Oral every 6 hours PRN psychotic agitation  haloperidol    Injectable 5 milliGRAM(s) IntraMuscular every 6 hours PRN SEVERE PSYCHOTIC AGITATION  LORazepam     Tablet 2 milliGRAM(s) Oral every 6 hours PRN Agitation  LORazepam   Injectable 2 milliGRAM(s) IntraMuscular every 6 hours PRN SEVERE AGITATION  
MEDICATIONS  (STANDING):  benztropine 0.5 milliGRAM(s) Oral two times a day  lithium 600 milliGRAM(s) Oral at bedtime  LORazepam     Tablet 1 milliGRAM(s) Oral at bedtime  risperiDONE   Tablet 1 milliGRAM(s) Oral at bedtime    MEDICATIONS  (PRN):  haloperidol     Tablet 5 milliGRAM(s) Oral every 6 hours PRN psychotic agitation  haloperidol    Injectable 5 milliGRAM(s) IntraMuscular every 6 hours PRN SEVERE PSYCHOTIC AGITATION  LORazepam     Tablet 2 milliGRAM(s) Oral every 6 hours PRN Agitation  LORazepam   Injectable 2 milliGRAM(s) IntraMuscular every 6 hours PRN SEVERE AGITATION  melatonin. 3 milliGRAM(s) Oral at bedtime PRN Insomnia  
MEDICATIONS  (STANDING):  benztropine 0.5 milliGRAM(s) Oral two times a day  lithium 600 milliGRAM(s) Oral at bedtime  LORazepam     Tablet 1 milliGRAM(s) Oral at bedtime  risperiDONE   Tablet 1 milliGRAM(s) Oral at bedtime    MEDICATIONS  (PRN):  haloperidol     Tablet 5 milliGRAM(s) Oral every 6 hours PRN psychotic agitation  haloperidol    Injectable 5 milliGRAM(s) IntraMuscular every 6 hours PRN SEVERE PSYCHOTIC AGITATION  LORazepam     Tablet 2 milliGRAM(s) Oral every 6 hours PRN Agitation  LORazepam   Injectable 2 milliGRAM(s) IntraMuscular every 6 hours PRN SEVERE AGITATION  melatonin. 3 milliGRAM(s) Oral at bedtime PRN Insomnia  
MEDICATIONS  (STANDING):  ARIPiprazole 15 milliGRAM(s) Oral daily  lithium 600 milliGRAM(s) Oral at bedtime  LORazepam     Tablet 1 milliGRAM(s) Oral at bedtime    MEDICATIONS  (PRN):  haloperidol     Tablet 5 milliGRAM(s) Oral every 6 hours PRN psychotic agitation  haloperidol    Injectable 5 milliGRAM(s) IntraMuscular every 6 hours PRN SEVERE PSYCHOTIC AGITATION  LORazepam     Tablet 2 milliGRAM(s) Oral every 6 hours PRN Agitation  LORazepam   Injectable 2 milliGRAM(s) IntraMuscular every 6 hours PRN SEVERE AGITATION  melatonin. 3 milliGRAM(s) Oral at bedtime PRN Insomnia  
MEDICATIONS  (STANDING):  ARIPiprazole 5 milliGRAM(s) Oral daily  benztropine 1 milliGRAM(s) Oral two times a day  lithium 600 milliGRAM(s) Oral at bedtime  LORazepam     Tablet 1 milliGRAM(s) Oral at bedtime  propranolol 10 milliGRAM(s) Oral two times a day    MEDICATIONS  (PRN):  haloperidol     Tablet 5 milliGRAM(s) Oral every 6 hours PRN psychotic agitation  haloperidol    Injectable 5 milliGRAM(s) IntraMuscular every 6 hours PRN SEVERE PSYCHOTIC AGITATION  LORazepam     Tablet 2 milliGRAM(s) Oral every 6 hours PRN Agitation  LORazepam   Injectable 2 milliGRAM(s) IntraMuscular every 6 hours PRN SEVERE AGITATION  melatonin. 3 milliGRAM(s) Oral at bedtime PRN Insomnia  
MEDICATIONS  (STANDING):  ARIPiprazole 5 milliGRAM(s) Oral daily  lithium 600 milliGRAM(s) Oral at bedtime    MEDICATIONS  (PRN):  haloperidol     Tablet 5 milliGRAM(s) Oral every 6 hours PRN psychotic agitation  haloperidol    Injectable 5 milliGRAM(s) IntraMuscular every 6 hours PRN SEVERE PSYCHOTIC AGITATION  LORazepam     Tablet 2 milliGRAM(s) Oral every 6 hours PRN Agitation  LORazepam   Injectable 2 milliGRAM(s) IntraMuscular every 6 hours PRN SEVERE AGITATION  
MEDICATIONS  (STANDING):  benztropine 0.5 milliGRAM(s) Oral two times a day  lithium 600 milliGRAM(s) Oral at bedtime  LORazepam     Tablet 1 milliGRAM(s) Oral at bedtime  risperiDONE   Tablet 1 milliGRAM(s) Oral at bedtime    MEDICATIONS  (PRN):  haloperidol     Tablet 5 milliGRAM(s) Oral every 6 hours PRN psychotic agitation  haloperidol    Injectable 5 milliGRAM(s) IntraMuscular every 6 hours PRN SEVERE PSYCHOTIC AGITATION  LORazepam     Tablet 2 milliGRAM(s) Oral every 6 hours PRN Agitation  LORazepam   Injectable 2 milliGRAM(s) IntraMuscular every 6 hours PRN SEVERE AGITATION  melatonin. 3 milliGRAM(s) Oral at bedtime PRN Insomnia  
MEDICATIONS  (STANDING):  ARIPiprazole 10 milliGRAM(s) Oral daily  benztropine 1 milliGRAM(s) Oral two times a day  lithium 600 milliGRAM(s) Oral at bedtime  LORazepam     Tablet 1 milliGRAM(s) Oral at bedtime  propranolol 10 milliGRAM(s) Oral two times a day    MEDICATIONS  (PRN):  haloperidol     Tablet 5 milliGRAM(s) Oral every 6 hours PRN psychotic agitation  haloperidol    Injectable 5 milliGRAM(s) IntraMuscular every 6 hours PRN SEVERE PSYCHOTIC AGITATION  LORazepam     Tablet 2 milliGRAM(s) Oral every 6 hours PRN Agitation  LORazepam   Injectable 2 milliGRAM(s) IntraMuscular every 6 hours PRN SEVERE AGITATION  melatonin. 3 milliGRAM(s) Oral at bedtime PRN Insomnia  
MEDICATIONS  (STANDING):  ARIPiprazole 15 milliGRAM(s) Oral daily  lithium 600 milliGRAM(s) Oral at bedtime  LORazepam     Tablet 1 milliGRAM(s) Oral at bedtime    MEDICATIONS  (PRN):  haloperidol     Tablet 5 milliGRAM(s) Oral every 6 hours PRN psychotic agitation  haloperidol    Injectable 5 milliGRAM(s) IntraMuscular every 6 hours PRN SEVERE PSYCHOTIC AGITATION  LORazepam     Tablet 2 milliGRAM(s) Oral every 6 hours PRN Agitation  LORazepam   Injectable 2 milliGRAM(s) IntraMuscular every 6 hours PRN SEVERE AGITATION  melatonin. 3 milliGRAM(s) Oral at bedtime PRN Insomnia  
MEDICATIONS  (STANDING):  ARIPiprazole 10 milliGRAM(s) Oral daily  lithium 600 milliGRAM(s) Oral at bedtime  LORazepam     Tablet 0.5 milliGRAM(s) Oral two times a day    MEDICATIONS  (PRN):  haloperidol     Tablet 5 milliGRAM(s) Oral every 6 hours PRN psychotic agitation  haloperidol    Injectable 5 milliGRAM(s) IntraMuscular every 6 hours PRN SEVERE PSYCHOTIC AGITATION  LORazepam     Tablet 2 milliGRAM(s) Oral every 6 hours PRN Agitation  LORazepam   Injectable 2 milliGRAM(s) IntraMuscular every 6 hours PRN SEVERE AGITATION  melatonin. 3 milliGRAM(s) Oral at bedtime PRN Insomnia  
MEDICATIONS  (STANDING):  benztropine 1 milliGRAM(s) Oral two times a day  lithium 600 milliGRAM(s) Oral at bedtime  LORazepam     Tablet 1 milliGRAM(s) Oral at bedtime  propranolol 10 milliGRAM(s) Oral two times a day    MEDICATIONS  (PRN):  haloperidol     Tablet 5 milliGRAM(s) Oral every 6 hours PRN psychotic agitation  haloperidol    Injectable 5 milliGRAM(s) IntraMuscular every 6 hours PRN SEVERE PSYCHOTIC AGITATION  LORazepam     Tablet 2 milliGRAM(s) Oral every 6 hours PRN Agitation  LORazepam   Injectable 2 milliGRAM(s) IntraMuscular every 6 hours PRN SEVERE AGITATION  melatonin. 3 milliGRAM(s) Oral at bedtime PRN Insomnia  
MEDICATIONS  (STANDING):  benztropine 1 milliGRAM(s) Oral two times a day  lithium 600 milliGRAM(s) Oral at bedtime  LORazepam     Tablet 1 milliGRAM(s) Oral at bedtime  propranolol 10 milliGRAM(s) Oral two times a day  risperiDONE   Tablet 1 milliGRAM(s) Oral at bedtime    MEDICATIONS  (PRN):  haloperidol     Tablet 5 milliGRAM(s) Oral every 6 hours PRN psychotic agitation  haloperidol    Injectable 5 milliGRAM(s) IntraMuscular every 6 hours PRN SEVERE PSYCHOTIC AGITATION  LORazepam     Tablet 2 milliGRAM(s) Oral every 6 hours PRN Agitation  LORazepam   Injectable 2 milliGRAM(s) IntraMuscular every 6 hours PRN SEVERE AGITATION  melatonin. 3 milliGRAM(s) Oral at bedtime PRN Insomnia  
MEDICATIONS  (STANDING):  benztropine 0.5 milliGRAM(s) Oral two times a day  lithium 600 milliGRAM(s) Oral at bedtime  LORazepam     Tablet 1 milliGRAM(s) Oral at bedtime  risperiDONE   Tablet 1 milliGRAM(s) Oral at bedtime    MEDICATIONS  (PRN):  haloperidol     Tablet 5 milliGRAM(s) Oral every 6 hours PRN psychotic agitation  haloperidol    Injectable 5 milliGRAM(s) IntraMuscular every 6 hours PRN SEVERE PSYCHOTIC AGITATION  LORazepam     Tablet 2 milliGRAM(s) Oral every 6 hours PRN Agitation  LORazepam   Injectable 2 milliGRAM(s) IntraMuscular every 6 hours PRN SEVERE AGITATION  melatonin. 3 milliGRAM(s) Oral at bedtime PRN Insomnia  
MEDICATIONS  (STANDING):  benztropine 1 milliGRAM(s) Oral two times a day  lithium 600 milliGRAM(s) Oral at bedtime  LORazepam     Tablet 1 milliGRAM(s) Oral at bedtime  risperiDONE   Tablet 1 milliGRAM(s) Oral at bedtime    MEDICATIONS  (PRN):  haloperidol     Tablet 5 milliGRAM(s) Oral every 6 hours PRN psychotic agitation  haloperidol    Injectable 5 milliGRAM(s) IntraMuscular every 6 hours PRN SEVERE PSYCHOTIC AGITATION  LORazepam     Tablet 2 milliGRAM(s) Oral every 6 hours PRN Agitation  LORazepam   Injectable 2 milliGRAM(s) IntraMuscular every 6 hours PRN SEVERE AGITATION  melatonin. 3 milliGRAM(s) Oral at bedtime PRN Insomnia  
MEDICATIONS  (STANDING):  benztropine 1 milliGRAM(s) Oral two times a day  lithium 600 milliGRAM(s) Oral at bedtime  LORazepam     Tablet 1 milliGRAM(s) Oral at bedtime  risperiDONE   Tablet 1 milliGRAM(s) Oral at bedtime    MEDICATIONS  (PRN):  haloperidol     Tablet 5 milliGRAM(s) Oral every 6 hours PRN psychotic agitation  haloperidol    Injectable 5 milliGRAM(s) IntraMuscular every 6 hours PRN SEVERE PSYCHOTIC AGITATION  LORazepam     Tablet 2 milliGRAM(s) Oral every 6 hours PRN Agitation  LORazepam   Injectable 2 milliGRAM(s) IntraMuscular every 6 hours PRN SEVERE AGITATION  melatonin. 3 milliGRAM(s) Oral at bedtime PRN Insomnia  
MEDICATIONS  (STANDING):  ARIPiprazole 10 milliGRAM(s) Oral daily  benztropine 1 milliGRAM(s) Oral two times a day  lithium 600 milliGRAM(s) Oral at bedtime  LORazepam     Tablet 1 milliGRAM(s) Oral at bedtime  propranolol 10 milliGRAM(s) Oral two times a day    MEDICATIONS  (PRN):  haloperidol     Tablet 5 milliGRAM(s) Oral every 6 hours PRN psychotic agitation  haloperidol    Injectable 5 milliGRAM(s) IntraMuscular every 6 hours PRN SEVERE PSYCHOTIC AGITATION  LORazepam     Tablet 2 milliGRAM(s) Oral every 6 hours PRN Agitation  LORazepam   Injectable 2 milliGRAM(s) IntraMuscular every 6 hours PRN SEVERE AGITATION  melatonin. 3 milliGRAM(s) Oral at bedtime PRN Insomnia  
MEDICATIONS  (STANDING):  lithium 600 milliGRAM(s) Oral at bedtime  LORazepam     Tablet 0.5 milliGRAM(s) Oral two times a day    MEDICATIONS  (PRN):  haloperidol     Tablet 5 milliGRAM(s) Oral every 6 hours PRN psychotic agitation  haloperidol    Injectable 5 milliGRAM(s) IntraMuscular every 6 hours PRN SEVERE PSYCHOTIC AGITATION  LORazepam     Tablet 2 milliGRAM(s) Oral every 6 hours PRN Agitation  LORazepam   Injectable 2 milliGRAM(s) IntraMuscular every 6 hours PRN SEVERE AGITATION  melatonin. 3 milliGRAM(s) Oral at bedtime PRN Insomnia  
MEDICATIONS  (STANDING):  benztropine 0.5 milliGRAM(s) Oral two times a day  lithium 600 milliGRAM(s) Oral at bedtime  LORazepam     Tablet 1 milliGRAM(s) Oral at bedtime  risperiDONE   Tablet 1 milliGRAM(s) Oral at bedtime    MEDICATIONS  (PRN):  haloperidol     Tablet 5 milliGRAM(s) Oral every 6 hours PRN psychotic agitation  haloperidol    Injectable 5 milliGRAM(s) IntraMuscular every 6 hours PRN SEVERE PSYCHOTIC AGITATION  LORazepam     Tablet 2 milliGRAM(s) Oral every 6 hours PRN Agitation  LORazepam   Injectable 2 milliGRAM(s) IntraMuscular every 6 hours PRN SEVERE AGITATION  melatonin. 3 milliGRAM(s) Oral at bedtime PRN Insomnia

## 2021-04-29 NOTE — BH INPATIENT PSYCHIATRY PROGRESS NOTE - NSBHASSESSSUMMFT_PSY_ALL_CORE
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    Dx at discharge: Bipolar 1 Disorder, most recent episode manic with psychotic features    The patient has remained in good behavioral control with resolution of manic symptoms-stable mood and sleep, linear thought process, speech wnl, no grandiosity or PMA. Also with near complete resolution of psychotic symptoms-with residual ideas of reference, often not reaching full delusional quality. Now tolerating medications well and agreeable to treatment. Future oriented towards relationships and hobbies.    Brielle is at chronic risk of harm to self and others/inability to care for self given history of Bipolar I Disorder, multiple psychiatric hospitalizations, and medication non-compliance. Chronic protective factors include lack of h/o SI, SA, NSSIB, substance use, trauma, legal issues or violence.     On admission the patient was felt to be at an acutely elevated risk of harm to others/inability to care for self as she presented with affective lability, decreased need for sleep, delusions and hallucinations, paranoia, and poor attention to ADLs. Over the hospital course anson and psychosis were addressed via medication management, group and milieu therapy. Mood stabilized to largely euthymic and psychotic symptoms reduced to some continued minimal Orthodox based ideas of reference but not full delusions or delusions that are influencing behavior in a way that is dangerous for the patient. She consistently denies passive/active SI, VI/HI and is attending to ADLs adequately. She is future oriented to treatment, improving relationship with parents and continuing to increase socialization.    Given the above, the patient is no longer felt to be at an acutely elevated risk of harm to self/others or inability to care for self and therefore no longer requires inpatient hospitalization. She is stable for transition to outpatient level of care.      PLAN:  -discharge home today with parents--all in agreement with discharge plan  - continue risperdal 1 mg HS for psychosis  - continue cogentin to 0.5 mg BID for EPS-no stiffness today  - Continue Lithium 600 mg PO QHS for bipolar anson. lithium level 4/22 0.9   - Continue Ativan 1 mg PO QHS for anson/insomnia.   -30 day supply of all medications provided  -f/u arranged in Bipolar Clinic at [facility]  -safety plan and emergency procedures discussed with pt and family include use of [facility] crisis clinic, 911 or ED for acute safety concerns  -pt and family aware of how to contact 1S team if questions arise

## 2021-04-29 NOTE — BH INPATIENT PSYCHIATRY PROGRESS NOTE - NSBHCHARTREVIEWVS_PSY_A_CORE FT
Vital Signs Last 24 Hrs  T(C): 36.2 (12 Apr 2021 06:21), Max: 36.2 (12 Apr 2021 06:21)  T(F): 97.2 (12 Apr 2021 06:21), Max: 97.2 (12 Apr 2021 06:21)  HR: 68 (12 Apr 2021 06:21) (68 - 68)  BP: 106/72 (12 Apr 2021 06:21) (106/72 - 106/72)  BP(mean): --  RR: --  SpO2: --
Vital Signs Last 24 Hrs  T(C): 36.3 (23 Apr 2021 06:59), Max: 36.7 (22 Apr 2021 21:08)  T(F): 97.3 (23 Apr 2021 06:59), Max: 98.1 (22 Apr 2021 21:08)  HR: 86 (23 Apr 2021 06:59) (86 - 86)  BP: 113/77 (23 Apr 2021 06:59) (113/77 - 113/77)  BP(mean): --  RR: --  SpO2: --
Vital Signs Last 24 Hrs  T(C): 37.2 (19 Apr 2021 06:20), Max: 37.6 (18 Apr 2021 20:50)  T(F): 99 (19 Apr 2021 06:20), Max: 99.6 (18 Apr 2021 20:50)  HR: 89 (19 Apr 2021 06:20) (89 - 100)  BP: 114/67 (19 Apr 2021 06:20) (114/67 - 117/78)  BP(mean): --  RR: --  SpO2: --
Vital Signs Last 24 Hrs  T(C): 36.2 (03 Apr 2021 06:46), Max: 36.2 (03 Apr 2021 06:46)  T(F): 97.2 (03 Apr 2021 06:46), Max: 97.2 (03 Apr 2021 06:46)  HR: 108 (03 Apr 2021 06:46) (108 - 108)  BP: 123/82 (03 Apr 2021 06:46) (123/82 - 123/82)  BP(mean): --  RR: 18 (03 Apr 2021 06:46) (18 - 18)  SpO2: --
Vital Signs Last 24 Hrs  T(C): 36.8 (07 Apr 2021 07:52), Max: 37.2 (06 Apr 2021 20:36)  T(F): 98.2 (07 Apr 2021 07:52), Max: 98.9 (06 Apr 2021 20:36)  HR: --  BP: 105/71 (07 Apr 2021 07:52) (105/71 - 105/71)  BP(mean): 94 (07 Apr 2021 07:52) (94 - 94)  RR: --  SpO2: --
Vital Signs Last 24 Hrs  T(C): 36.8 (09 Apr 2021 08:41), Max: 36.9 (08 Apr 2021 19:59)  T(F): 98.2 (09 Apr 2021 08:41), Max: 98.5 (08 Apr 2021 19:59)  HR: --  BP: 106/73 (09 Apr 2021 08:41) (106/73 - 106/73)  BP(mean): 81 (09 Apr 2021 08:41) (81 - 81)  RR: --  SpO2: --
Vital Signs Last 24 Hrs  T(C): 36.4 (08 Apr 2021 06:45), Max: 36.7 (07 Apr 2021 20:57)  T(F): 97.5 (08 Apr 2021 06:45), Max: 98 (07 Apr 2021 20:57)  HR: 98 (08 Apr 2021 06:45) (98 - 98)  BP: 119/87 (08 Apr 2021 06:45) (119/87 - 119/87)  BP(mean): --  RR: --  SpO2: --
Vital Signs Last 24 Hrs  T(C): 36.2 (29 Apr 2021 06:35), Max: 36.2 (29 Apr 2021 06:35)  T(F): 97.1 (29 Apr 2021 06:35), Max: 97.1 (29 Apr 2021 06:35)  HR: 77 (29 Apr 2021 06:35) (77 - 77)  BP: 108/69 (29 Apr 2021 06:35) (108/69 - 108/69)  BP(mean): --  RR: --  SpO2: --
Vital Signs Last 24 Hrs  T(C): 36.7 (04 Apr 2021 08:05), Max: 36.7 (04 Apr 2021 08:05)  T(F): 98 (04 Apr 2021 08:05), Max: 98 (04 Apr 2021 08:05)  HR: --  BP: --  BP(mean): --  RR: --  SpO2: --
Vital Signs Last 24 Hrs  T(C): 36.3 (26 Apr 2021 06:38), Max: 37 (25 Apr 2021 20:49)  T(F): 97.4 (26 Apr 2021 06:38), Max: 98.6 (25 Apr 2021 20:49)  HR: 81 (25 Apr 2021 20:49) (81 - 81)  BP: 127/62 (25 Apr 2021 20:49) (127/62 - 127/62)  BP(mean): --  RR: 18 (25 Apr 2021 20:49) (18 - 18)  SpO2: --
Vital Signs Last 24 Hrs  T(C): 37.2 (20 Apr 2021 06:04), Max: 37.3 (19 Apr 2021 20:41)  T(F): 98.9 (20 Apr 2021 06:04), Max: 99.2 (19 Apr 2021 20:41)  HR: 90 (20 Apr 2021 06:04) (90 - 90)  BP: 118/87 (20 Apr 2021 06:04) (118/87 - 118/87)  BP(mean): --  RR: --  SpO2: --
Vital Signs Last 24 Hrs  T(C): 36.3 (22 Apr 2021 06:42), Max: 36.4 (21 Apr 2021 21:00)  T(F): 97.4 (22 Apr 2021 06:42), Max: 97.5 (21 Apr 2021 21:00)  HR: 87 (22 Apr 2021 06:42) (87 - 87)  BP: 111/79 (22 Apr 2021 06:42) (111/79 - 111/79)  BP(mean): --  RR: --  SpO2: --
Vital Signs Last 24 Hrs  T(C): 36.3 (14 Apr 2021 06:18), Max: 36.3 (14 Apr 2021 06:18)  T(F): 97.3 (14 Apr 2021 06:18), Max: 97.3 (14 Apr 2021 06:18)  HR: 81 (14 Apr 2021 06:18) (81 - 81)  BP: 94/63 (14 Apr 2021 06:18) (94/63 - 94/63)  BP(mean): --  RR: --  SpO2: --
Vital Signs Last 24 Hrs  T(C): 37.1 (27 Apr 2021 08:10), Max: 37.1 (26 Apr 2021 20:30)  T(F): 98.7 (27 Apr 2021 08:10), Max: 98.8 (26 Apr 2021 20:30)  HR: 97 (27 Apr 2021 08:10) (97 - 112)  BP: 116/87 (27 Apr 2021 08:10) (116/87 - 128/88)  BP(mean): --  RR: --  SpO2: --
Vital Signs Last 24 Hrs  T(C): 36.4 (15 Apr 2021 06:53), Max: 36.5 (14 Apr 2021 22:31)  T(F): 97.6 (15 Apr 2021 06:53), Max: 97.7 (14 Apr 2021 22:31)  HR: 82 (15 Apr 2021 06:53) (82 - 82)  BP: 105/76 (15 Apr 2021 06:53) (105/76 - 105/76)  BP(mean): --  RR: --  SpO2: --
Vital Signs Last 24 Hrs  T(C): 36.7 (06 Apr 2021 06:32), Max: 36.7 (05 Apr 2021 20:54)  T(F): 98 (06 Apr 2021 06:32), Max: 98 (05 Apr 2021 20:54)  HR: 90 (05 Apr 2021 23:36) (90 - 90)  BP: 127/70 (05 Apr 2021 23:36) (127/70 - 127/70)  BP(mean): --  RR: --  SpO2: --
Vital Signs Last 24 Hrs  T(C): 36.8 (13 Apr 2021 07:55), Max: 36.8 (13 Apr 2021 07:55)  T(F): 98.2 (13 Apr 2021 07:55), Max: 98.2 (13 Apr 2021 07:55)  HR: 99 (13 Apr 2021 07:55) (99 - 99)  BP: 105/68 (13 Apr 2021 07:55) (105/68 - 105/68)  BP(mean): --  RR: --  SpO2: --
Vital Signs Last 24 Hrs  T(C): 36.2 (21 Apr 2021 07:45), Max: 36.2 (20 Apr 2021 20:56)  T(F): 97.1 (21 Apr 2021 07:45), Max: 97.2 (20 Apr 2021 20:56)  HR: 106 (21 Apr 2021 07:45) (106 - 106)  BP: 117/68 (21 Apr 2021 07:45) (117/68 - 117/68)  BP(mean): --  RR: --  SpO2: --
Vital Signs Last 24 Hrs  T(C): 36.7 (28 Apr 2021 20:33), Max: 36.7 (28 Apr 2021 20:33)  T(F): 98.1 (28 Apr 2021 20:33), Max: 98.1 (28 Apr 2021 20:33)  HR: --  BP: --  BP(mean): --  RR: 17 (28 Apr 2021 06:12) (17 - 17)  SpO2: --
Vital Signs Last 24 Hrs  T(C): 36.7 (16 Apr 2021 07:48), Max: 36.7 (15 Apr 2021 22:36)  T(F): 98 (16 Apr 2021 07:48), Max: 98 (15 Apr 2021 22:36)  HR: 80 (16 Apr 2021 07:48) (80 - 80)  BP: 118/72 (16 Apr 2021 07:48) (118/72 - 118/72)  BP(mean): --  RR: --  SpO2: --
Vital Signs Last 24 Hrs  T(C): 36.1 (05 Apr 2021 07:48), Max: 36.7 (04 Apr 2021 20:19)  T(F): 96.9 (05 Apr 2021 07:48), Max: 98.1 (04 Apr 2021 20:19)  HR: 100 (05 Apr 2021 07:48) (100 - 100)  BP: 117/74 (05 Apr 2021 07:48) (117/74 - 117/74)  BP(mean): --  RR: --  SpO2: --

## 2021-04-29 NOTE — BH INPATIENT PSYCHIATRY PROGRESS NOTE - NSTXPROBMEDIC_PSY_ALL_CORE
MEDICATION/TREATMENT NON-COMPLIANCE

## 2021-04-29 NOTE — BH INPATIENT PSYCHIATRY PROGRESS NOTE - MSE UNSTRUCTURED FT
MSE:  MAGGIE/BEH: Adult female in no acute distress; dressed appropriately but mildly disheveled appearing; calm & cooperative; fair eye contact and relatedness  SPEECH: normal rate and volume, monotone.   MOTOR: No PMA/PMR; appears less postured, more reactive, no tremor, no other abnormal movements including TD. no rigidity  MOOD: “good”.   AFFECT: euthymic, and mood congruent; increased range.   THOUGHT PROCESS: Linear, logical, and goal-directed.   THOUGHT CONTENT: Focused on treatment plan and positives of hospitalization. Denies SI/HI; denies AVH; no IoR or eran delusions  COGNITION: Grossly intact.   INSIGHT: limited to fair  JUDGMENT: fair  IC: intact

## 2021-04-29 NOTE — BH INPATIENT PSYCHIATRY PROGRESS NOTE - NSTXDISORGGOAL_PSY_ALL_CORE
Will make at least 3 goal and reality oriented statements during therapy
Will demonstrate purposeful and predictable thoughts/behaviors by making a request
Will make at least 3 goal and reality oriented statements during therapy
Will demonstrate purposeful and predictable thoughts/behaviors by making a request
Will make at least 3 goal and reality oriented statements during therapy
Will demonstrate purposeful and predictable thoughts/behaviors by making a request
Will make at least 3 goal and reality oriented statements during therapy
Will make at least 3 goal and reality oriented statements during therapy
Will demonstrate purposeful and predictable thoughts/behaviors by making a request
Will make at least 3 goal and reality oriented statements during therapy

## 2021-04-29 NOTE — BH INPATIENT PSYCHIATRY PROGRESS NOTE - NSTXDCOPNOGOAL_PSY_ALL_CORE
Will agree to participate in appropriate outpatient care

## 2021-04-29 NOTE — BH INPATIENT PSYCHIATRY PROGRESS NOTE - PRN MEDS
MEDICATIONS  (PRN):  haloperidol     Tablet 5 milliGRAM(s) Oral every 6 hours PRN psychotic agitation  haloperidol    Injectable 5 milliGRAM(s) IntraMuscular every 6 hours PRN SEVERE PSYCHOTIC AGITATION  LORazepam     Tablet 2 milliGRAM(s) Oral every 6 hours PRN Agitation  LORazepam   Injectable 2 milliGRAM(s) IntraMuscular every 6 hours PRN SEVERE AGITATION  melatonin. 3 milliGRAM(s) Oral at bedtime PRN Insomnia  
MEDICATIONS  (PRN):  haloperidol     Tablet 5 milliGRAM(s) Oral every 6 hours PRN psychotic agitation  haloperidol    Injectable 5 milliGRAM(s) IntraMuscular every 6 hours PRN SEVERE PSYCHOTIC AGITATION  LORazepam     Tablet 2 milliGRAM(s) Oral every 6 hours PRN Agitation  LORazepam   Injectable 2 milliGRAM(s) IntraMuscular every 6 hours PRN SEVERE AGITATION  
MEDICATIONS  (PRN):  haloperidol     Tablet 5 milliGRAM(s) Oral every 6 hours PRN psychotic agitation  haloperidol    Injectable 5 milliGRAM(s) IntraMuscular every 6 hours PRN SEVERE PSYCHOTIC AGITATION  LORazepam     Tablet 2 milliGRAM(s) Oral every 6 hours PRN Agitation  LORazepam   Injectable 2 milliGRAM(s) IntraMuscular every 6 hours PRN SEVERE AGITATION  melatonin. 3 milliGRAM(s) Oral at bedtime PRN Insomnia  
MEDICATIONS  (PRN):  haloperidol     Tablet 5 milliGRAM(s) Oral every 6 hours PRN psychotic agitation  haloperidol    Injectable 5 milliGRAM(s) IntraMuscular every 6 hours PRN SEVERE PSYCHOTIC AGITATION  LORazepam     Tablet 2 milliGRAM(s) Oral every 6 hours PRN Agitation  LORazepam   Injectable 2 milliGRAM(s) IntraMuscular every 6 hours PRN SEVERE AGITATION  
MEDICATIONS  (PRN):  haloperidol     Tablet 5 milliGRAM(s) Oral every 6 hours PRN psychotic agitation  haloperidol    Injectable 5 milliGRAM(s) IntraMuscular every 6 hours PRN SEVERE PSYCHOTIC AGITATION  LORazepam     Tablet 2 milliGRAM(s) Oral every 6 hours PRN Agitation  LORazepam   Injectable 2 milliGRAM(s) IntraMuscular every 6 hours PRN SEVERE AGITATION  melatonin. 3 milliGRAM(s) Oral at bedtime PRN Insomnia  

## 2021-04-29 NOTE — BH INPATIENT PSYCHIATRY PROGRESS NOTE - NSBHFUPINTERVALHXFT_PSY_A_CORE
Chart reviewed. Case discussed with interdisciplinary team. Patient seen and examined for follow up of anson and psychosis. No acute events overnight. Per report, pt is taking better care of ADLs, attending groups. Took medications, no PRNs. Per sleep log, slept thru night with one awakening.    On interview pt reports feeling good today, with good mood. Is going to miss friends and environment of the hospital but is looking forward to eating preferred foods and continuing hobbies she re-engaged with in the hospital including dancing, drawing, singing and painting, with goal to reduce screen time. Wants to stay in contact with friends. Regarding sleep reports sleeping well overnight, with good quality sleep. Reports energy as good this morning. Denies nausea, constipation or diarrhea or vomiting. Reports sustained improvement in appetite and PO intake-eating at all meals.    Reports clear thoughts. Denies AVH. Denies ideas of reference. Denies paranoid ideation, no grandiosity, TW/TI. Denies SI/HI. Motivated to continue medications (at least in the short term) and follow up with MD and therapy appointments.    No other physical complaints or c/f medication side effects. shaking/stiffness is largely resolved. Restlessness also reduced today. No tremor.

## 2021-04-29 NOTE — BH INPATIENT PSYCHIATRY PROGRESS NOTE - NSTXMANICDATEEST_PSY_ALL_CORE
08-Apr-2021
08-Apr-2021
22-Apr-2021
08-Apr-2021
15-Apr-2021
02-Apr-2021
15-Apr-2021
15-Apr-2021
02-Apr-2021
02-Apr-2021
22-Apr-2021
15-Apr-2021
08-Apr-2021
22-Apr-2021
02-Apr-2021
22-Apr-2021
02-Apr-2021
08-Apr-2021
15-Apr-2021
22-Apr-2021
22-Apr-2021

## 2021-04-29 NOTE — BH INPATIENT PSYCHIATRY PROGRESS NOTE - NSTXDCOPNODATETRGT_PSY_ALL_CORE
12-Apr-2021
19-Apr-2021
19-Apr-2021
26-Apr-2021
03-May-2021
12-Apr-2021
03-May-2021
19-Apr-2021
03-May-2021
26-Apr-2021
19-Apr-2021
12-Apr-2021
19-Apr-2021
26-Apr-2021
03-May-2021

## 2021-04-29 NOTE — BH INPATIENT PSYCHIATRY PROGRESS NOTE - NSTREATMENTCERTY_PSY_ALL_CORE

## 2021-04-29 NOTE — BH INPATIENT PSYCHIATRY PROGRESS NOTE - NSBHLEGALSTATUSNEW_PSY_ALL_CORE
9.27 (2PC)

## 2021-04-29 NOTE — BH INPATIENT PSYCHIATRY PROGRESS NOTE - NSBHFUPINTERVALCCFT_PSY_A_CORE
f/up bipolar d/o
f/up bipolar d/o
anson
"I'm feeling good and ready for discharge."
"I'm feeling good and stable."
anson
"I'm feeling good but I'm going to miss it here."
Patient seen for follow up for psychosis and anson.
"I'm feeling good, a little anxious."
f/up bipolar d/o
"I'm feeling good, a little bored."
"I'm doing well, able to dance and move."
"I'm feeling good but its hard to see other patients suffering."
"I feel happy."
f/up bipolar d/o
"I'm tired."
"I'm feeling good because my mood is good."
"I'm feeling pretty good, I have gotten more spiritual and it is helpful."
f/up bipolar d/o
"I'm feeling good and stable."
f/up bipolar d/o

## 2021-04-29 NOTE — BH INPATIENT PSYCHIATRY PROGRESS NOTE - NSBHPROGMEDSE_PSY_A_CORE FT
parkinsonism-resolving, akathesia-resolving; ? new nausea
parkinsonism-resolving
? parkinsonism
parkinsonism-resolving
parkinsonism-resolving, akathesia-resolving; ? new nausea and diarrhea? 
parkinsonism-resolving, akathesia-resolving; ? new nausea-significantly reduced today
parkinsonism-resolving
parkinsonism-resolving
parkinsonism-resolving, akathesia-resolving
parkinsonism-resolving
parkinsonism-resolving
? parkinsonism

## 2021-04-29 NOTE — BH INPATIENT PSYCHIATRY PROGRESS NOTE - NSBHCONTPROVIDER_PSY_ALL_CORE
Not applicable

## 2021-04-29 NOTE — BH INPATIENT PSYCHIATRY PROGRESS NOTE - NSBHATTENDATTEST_PSY_ALL_CORE
I have personally seen, examined and participated in the care of this patient. I have reviewed all pertinent clinical information, including history, physical exam, plan and the Medical/PA/NP Student’s note and agree except as noted.

## 2021-04-29 NOTE — BH INPATIENT PSYCHIATRY PROGRESS NOTE - NSBHINPTBILLING_PSY_ALL_CORE
29312 - Inpatient Moderate Complexity
04902 - Inpatient Moderate Complexity
01304 - Inpatient Moderate Complexity
41942 - Inpatient Moderate Complexity
85292 - Inpatient Moderate Complexity
03911 - Inpatient Moderate Complexity
15091 - Inpatient Moderate Complexity
04255 - Inpatient Moderate Complexity
52810 - Hospital Discharge Day Management; 30 min or less
81486 - Inpatient Moderate Complexity
52699 - Inpatient Moderate Complexity
56346 - Inpatient Moderate Complexity
72493 - Inpatient Moderate Complexity
42088 - Inpatient Moderate Complexity
54892 - Inpatient Moderate Complexity
06249 - Inpatient Moderate Complexity
59981 - Inpatient Moderate Complexity
14474 - Inpatient Moderate Complexity
30240 - Inpatient Moderate Complexity
65279 - Inpatient Moderate Complexity
13717 - Inpatient Moderate Complexity

## 2021-04-29 NOTE — BH INPATIENT PSYCHIATRY PROGRESS NOTE - NSTXDISORGDATEEST_PSY_ALL_CORE
02-Apr-2021
15-Apr-2021
02-Apr-2021
08-Apr-2021
22-Apr-2021
22-Apr-2021
02-Apr-2021
15-Apr-2021
08-Apr-2021
22-Apr-2021
08-Apr-2021
08-Apr-2021
15-Apr-2021
22-Apr-2021
15-Apr-2021
02-Apr-2021
22-Apr-2021
22-Apr-2021
15-Apr-2021
02-Apr-2021
08-Apr-2021

## 2021-04-29 NOTE — BH INPATIENT PSYCHIATRY PROGRESS NOTE - NSTXMEDICDATETRGT_PSY_ALL_CORE
15-Apr-2021
09-Apr-2021
16-Apr-2021
23-Apr-2021
09-Apr-2021
16-Apr-2021
23-Apr-2021
09-Apr-2021
29-Apr-2021
09-Apr-2021
23-Apr-2021
09-Apr-2021
29-Apr-2021
23-Apr-2021
16-Apr-2021
16-Apr-2021
22-Apr-2021
29-Apr-2021
29-Apr-2021

## 2021-04-29 NOTE — BH INPATIENT PSYCHIATRY PROGRESS NOTE - NSTXMEDICINTERMD_PSY_ALL_CORE
psychoeducation
psychoeducation
psychoeducation, offer WETZEL
psychoeducation
psychoeducation, offer WETZEL
psychoeducation, offer WETZEL
psychoeducation
psychoeducation, offer WETZEL
psychoeducation, offer WETZEL
psychoeducation
psychoeducation, offer WETZEL

## 2021-04-29 NOTE — BH INPATIENT PSYCHIATRY PROGRESS NOTE - NSTXMEDICDATEEST_PSY_ALL_CORE
09-Apr-2021
22-Apr-2021
02-Apr-2021
09-Apr-2021
16-Apr-2021
02-Apr-2021
02-Apr-2021
22-Apr-2021
16-Apr-2021
22-Apr-2021
02-Apr-2021
22-Apr-2021
02-Apr-2021
09-Apr-2021
22-Apr-2021
09-Apr-2021
15-Apr-2021
22-Apr-2021
08-Apr-2021

## 2021-04-29 NOTE — BH INPATIENT PSYCHIATRY PROGRESS NOTE - NSDCCRITERIA_PSY_ALL_CORE
symptom stabilization with improved level of functioning  CGI<=2

## 2021-04-29 NOTE — BH INPATIENT PSYCHIATRY PROGRESS NOTE - NSTXMANICPROGRES_PSY_ALL_CORE
Met - goal discontinued

## 2021-04-29 NOTE — BH INPATIENT PSYCHIATRY PROGRESS NOTE - NSBHCONSULTIPREASON_PSY_A_CORE
other reason
danger to self; mental illness expected to respond to inpatient care
other reason
danger to self; mental illness expected to respond to inpatient care

## 2021-04-29 NOTE — BH INPATIENT PSYCHIATRY PROGRESS NOTE - NSTXDCOPNOPROGRES_PSY_ALL_CORE
Improving
No Change
Improving
No Change
Improving
No Change
Improving
No Change
Improving
No Change

## 2021-04-29 NOTE — BH INPATIENT PSYCHIATRY PROGRESS NOTE - NSTXDCOPNOINTERMD_PSY_ALL_CORE
Consider WETZEL

## 2021-04-29 NOTE — BH INPATIENT PSYCHIATRY PROGRESS NOTE - NSBHCONSDANGERSELF_PSY_A_CORE
unable to care for self

## 2021-04-29 NOTE — BH INPATIENT PSYCHIATRY PROGRESS NOTE - NSTXPROBMANIC_PSY_ALL_CORE
MANIC SYMPTOMS

## 2021-04-30 ENCOUNTER — OUTPATIENT (OUTPATIENT)
Dept: OUTPATIENT SERVICES | Facility: HOSPITAL | Age: 21
LOS: 1 days | Discharge: HOME | End: 2021-04-30
Payer: COMMERCIAL

## 2021-05-01 PROCEDURE — 99239 HOSP IP/OBS DSCHRG MGMT >30: CPT

## 2021-05-20 DIAGNOSIS — F31.9 BIPOLAR DISORDER, UNSPECIFIED: ICD-10-CM

## 2021-08-12 PROCEDURE — 99443: CPT

## 2021-08-31 PROCEDURE — 99443: CPT

## 2021-11-11 NOTE — ED PROVIDER NOTE - NSRISKOFTRANSFER_ED_A_ED
Eye Protection Verbiage: Before proceeding with the stage, a plastic scleral shield was inserted. The globe was anesthetized with a few drops of 1% lidocaine with 1:100,000 epinephrine. Then, an appropriate sized scleral shield was chosen and coated with lacrilube ointment. The shield was gently inserted and left in place for the duration of each stage. After the stage was completed, the shield was gently removed. Transportation Risk (There is always a risk of traffic delays resulting in deterioration of condition.)

## 2021-11-17 PROCEDURE — 99442: CPT

## 2022-01-05 PROCEDURE — 99214 OFFICE O/P EST MOD 30 MIN: CPT | Mod: 95

## 2022-01-27 PROCEDURE — 99214 OFFICE O/P EST MOD 30 MIN: CPT | Mod: 95

## 2022-02-10 PROCEDURE — 99214 OFFICE O/P EST MOD 30 MIN: CPT | Mod: 95

## 2022-04-05 NOTE — ED BEHAVIORAL HEALTH ASSESSMENT NOTE - INTERRUPTED ATTEMPT:
Erivedge Counseling- I discussed with the patient the risks of Erivedge including but not limited to nausea, vomiting, diarrhea, constipation, weight loss, changes in the sense of taste, decreased appetite, muscle spasms, and hair loss.  The patient verbalized understanding of the proper use and possible adverse effects of Erivedge.  All of the patient's questions and concerns were addressed. None known

## 2022-04-14 PROCEDURE — 99214 OFFICE O/P EST MOD 30 MIN: CPT | Mod: 95

## 2022-04-21 PROCEDURE — 99214 OFFICE O/P EST MOD 30 MIN: CPT | Mod: 95

## 2022-05-19 PROCEDURE — 99442: CPT

## 2022-05-21 NOTE — BH PATIENT PROFILE - FUNCTIONAL SCREEN CURRENT LEVEL: DRESSING, MLM
Pt presents for substernal chest pain that radiates to back present right after waking this morning. Pt reports SOB with exertion. 0 = independent

## 2022-06-16 PROCEDURE — 99214 OFFICE O/P EST MOD 30 MIN: CPT | Mod: 95

## 2022-08-09 PROCEDURE — 99442: CPT

## 2022-09-06 PROCEDURE — 99214 OFFICE O/P EST MOD 30 MIN: CPT | Mod: 95

## 2022-09-22 PROCEDURE — 99214 OFFICE O/P EST MOD 30 MIN: CPT | Mod: 95

## 2022-09-29 ENCOUNTER — INPATIENT (INPATIENT)
Facility: HOSPITAL | Age: 22
LOS: 12 days | Discharge: HOME | End: 2022-10-12
Attending: STUDENT IN AN ORGANIZED HEALTH CARE EDUCATION/TRAINING PROGRAM | Admitting: PSYCHIATRY & NEUROLOGY
Payer: COMMERCIAL

## 2022-09-29 ENCOUNTER — OUTPATIENT (OUTPATIENT)
Dept: OUTPATIENT SERVICES | Facility: HOSPITAL | Age: 22
LOS: 1 days | Discharge: HOME | End: 2022-09-29

## 2022-09-29 VITALS — HEIGHT: 64 IN | WEIGHT: 127.87 LBS | TEMPERATURE: 98 F | OXYGEN SATURATION: 100 % | RESPIRATION RATE: 16 BRPM

## 2022-09-29 DIAGNOSIS — F31.9 BIPOLAR DISORDER, UNSPECIFIED: ICD-10-CM

## 2022-09-29 LAB
ALBUMIN SERPL ELPH-MCNC: 4.6 G/DL — SIGNIFICANT CHANGE UP (ref 3.3–5)
ALP SERPL-CCNC: 63 U/L — SIGNIFICANT CHANGE UP (ref 40–120)
ALT FLD-CCNC: 9 U/L — SIGNIFICANT CHANGE UP (ref 4–33)
AMPHET UR-MCNC: NEGATIVE — SIGNIFICANT CHANGE UP
ANION GAP SERPL CALC-SCNC: 8 MMOL/L — SIGNIFICANT CHANGE UP (ref 7–14)
AST SERPL-CCNC: 13 U/L — SIGNIFICANT CHANGE UP (ref 4–32)
BARBITURATES UR SCN-MCNC: NEGATIVE — SIGNIFICANT CHANGE UP
BASOPHILS # BLD AUTO: 0.02 K/UL — SIGNIFICANT CHANGE UP (ref 0–0.2)
BASOPHILS NFR BLD AUTO: 0.2 % — SIGNIFICANT CHANGE UP (ref 0–2)
BENZODIAZ UR-MCNC: NEGATIVE — SIGNIFICANT CHANGE UP
BILIRUB SERPL-MCNC: 0.4 MG/DL — SIGNIFICANT CHANGE UP (ref 0.2–1.2)
BUN SERPL-MCNC: 10 MG/DL — SIGNIFICANT CHANGE UP (ref 7–23)
CALCIUM SERPL-MCNC: 9.6 MG/DL — SIGNIFICANT CHANGE UP (ref 8.4–10.5)
CHLORIDE SERPL-SCNC: 103 MMOL/L — SIGNIFICANT CHANGE UP (ref 98–107)
CHOLEST SERPL-MCNC: 125 MG/DL — SIGNIFICANT CHANGE UP
CO2 SERPL-SCNC: 25 MMOL/L — SIGNIFICANT CHANGE UP (ref 22–31)
COCAINE METAB.OTHER UR-MCNC: NEGATIVE — SIGNIFICANT CHANGE UP
CREAT SERPL-MCNC: 0.74 MG/DL — SIGNIFICANT CHANGE UP (ref 0.5–1.3)
CREATININE URINE RESULT, DAU: 179 MG/DL — SIGNIFICANT CHANGE UP
EGFR: 118 ML/MIN/1.73M2 — SIGNIFICANT CHANGE UP
EOSINOPHIL # BLD AUTO: 0.19 K/UL — SIGNIFICANT CHANGE UP (ref 0–0.5)
EOSINOPHIL NFR BLD AUTO: 1.9 % — SIGNIFICANT CHANGE UP (ref 0–6)
GLUCOSE SERPL-MCNC: 122 MG/DL — HIGH (ref 70–99)
HCG UR QL: NEGATIVE — SIGNIFICANT CHANGE UP
HCT VFR BLD CALC: 42.8 % — SIGNIFICANT CHANGE UP (ref 34.5–45)
HDLC SERPL-MCNC: 58 MG/DL — SIGNIFICANT CHANGE UP
HGB BLD-MCNC: 14.2 G/DL — SIGNIFICANT CHANGE UP (ref 11.5–15.5)
IANC: 6.68 K/UL — SIGNIFICANT CHANGE UP (ref 1.8–7.4)
IMM GRANULOCYTES NFR BLD AUTO: 0.2 % — SIGNIFICANT CHANGE UP (ref 0–0.9)
LIPID PNL WITH DIRECT LDL SERPL: 57 MG/DL — SIGNIFICANT CHANGE UP
LITHIUM SERPL-MCNC: 0.2 MMOL/L — LOW (ref 0.6–1.2)
LYMPHOCYTES # BLD AUTO: 2.38 K/UL — SIGNIFICANT CHANGE UP (ref 1–3.3)
LYMPHOCYTES # BLD AUTO: 23.3 % — SIGNIFICANT CHANGE UP (ref 13–44)
MCHC RBC-ENTMCNC: 29.1 PG — SIGNIFICANT CHANGE UP (ref 27–34)
MCHC RBC-ENTMCNC: 33.2 GM/DL — SIGNIFICANT CHANGE UP (ref 32–36)
MCV RBC AUTO: 87.7 FL — SIGNIFICANT CHANGE UP (ref 80–100)
METHADONE UR-MCNC: NEGATIVE — SIGNIFICANT CHANGE UP
MONOCYTES # BLD AUTO: 0.92 K/UL — HIGH (ref 0–0.9)
MONOCYTES NFR BLD AUTO: 9 % — SIGNIFICANT CHANGE UP (ref 2–14)
NEUTROPHILS # BLD AUTO: 6.68 K/UL — SIGNIFICANT CHANGE UP (ref 1.8–7.4)
NEUTROPHILS NFR BLD AUTO: 65.4 % — SIGNIFICANT CHANGE UP (ref 43–77)
NON HDL CHOLESTEROL: 67 MG/DL — SIGNIFICANT CHANGE UP
NRBC # BLD: 0 /100 WBCS — SIGNIFICANT CHANGE UP (ref 0–0)
NRBC # FLD: 0 K/UL — SIGNIFICANT CHANGE UP (ref 0–0)
OPIATES UR-MCNC: NEGATIVE — SIGNIFICANT CHANGE UP
OXYCODONE UR-MCNC: NEGATIVE — SIGNIFICANT CHANGE UP
PCP SPEC-MCNC: SIGNIFICANT CHANGE UP
PCP UR-MCNC: NEGATIVE — SIGNIFICANT CHANGE UP
PLATELET # BLD AUTO: 380 K/UL — SIGNIFICANT CHANGE UP (ref 150–400)
POTASSIUM SERPL-MCNC: 3.7 MMOL/L — SIGNIFICANT CHANGE UP (ref 3.5–5.3)
POTASSIUM SERPL-SCNC: 3.7 MMOL/L — SIGNIFICANT CHANGE UP (ref 3.5–5.3)
PROT SERPL-MCNC: 7.4 G/DL — SIGNIFICANT CHANGE UP (ref 6–8.3)
RBC # BLD: 4.88 M/UL — SIGNIFICANT CHANGE UP (ref 3.8–5.2)
RBC # FLD: 12.9 % — SIGNIFICANT CHANGE UP (ref 10.3–14.5)
SARS-COV-2 RNA SPEC QL NAA+PROBE: SIGNIFICANT CHANGE UP
SODIUM SERPL-SCNC: 136 MMOL/L — SIGNIFICANT CHANGE UP (ref 135–145)
THC UR QL: NEGATIVE — SIGNIFICANT CHANGE UP
TRIGL SERPL-MCNC: 52 MG/DL — SIGNIFICANT CHANGE UP
WBC # BLD: 10.21 K/UL — SIGNIFICANT CHANGE UP (ref 3.8–10.5)
WBC # FLD AUTO: 10.21 K/UL — SIGNIFICANT CHANGE UP (ref 3.8–10.5)

## 2022-09-29 PROCEDURE — 93010 ELECTROCARDIOGRAM REPORT: CPT

## 2022-09-29 PROCEDURE — 90839 PSYTX CRISIS INITIAL 60 MIN: CPT

## 2022-09-29 PROCEDURE — 99221 1ST HOSP IP/OBS SF/LOW 40: CPT

## 2022-09-29 PROCEDURE — 90839 PSYTX CRISIS INITIAL 60 MIN: CPT | Mod: 95

## 2022-09-29 RX ORDER — OLANZAPINE 15 MG/1
5 TABLET, FILM COATED ORAL ONCE
Refills: 0 | Status: DISCONTINUED | OUTPATIENT
Start: 2022-09-29 | End: 2022-10-12

## 2022-09-29 RX ORDER — LANOLIN ALCOHOL/MO/W.PET/CERES
3 CREAM (GRAM) TOPICAL AT BEDTIME
Refills: 0 | Status: DISCONTINUED | OUTPATIENT
Start: 2022-09-29 | End: 2022-10-12

## 2022-09-29 RX ORDER — OLANZAPINE 15 MG/1
5 TABLET, FILM COATED ORAL EVERY 4 HOURS
Refills: 0 | Status: DISCONTINUED | OUTPATIENT
Start: 2022-09-29 | End: 2022-10-12

## 2022-09-29 RX ORDER — QUETIAPINE FUMARATE 200 MG/1
50 TABLET, FILM COATED ORAL AT BEDTIME
Refills: 0 | Status: DISCONTINUED | OUTPATIENT
Start: 2022-09-29 | End: 2022-10-03

## 2022-09-29 RX ORDER — QUETIAPINE FUMARATE 200 MG/1
50 TABLET, FILM COATED ORAL AT BEDTIME
Refills: 0 | Status: DISCONTINUED | OUTPATIENT
Start: 2022-09-29 | End: 2022-10-12

## 2022-09-29 RX ADMIN — QUETIAPINE FUMARATE 50 MILLIGRAM(S): 200 TABLET, FILM COATED ORAL at 20:54

## 2022-09-29 RX ADMIN — Medication 3 MILLIGRAM(S): at 20:53

## 2022-09-29 NOTE — BH INPATIENT PSYCHIATRY ASSESSMENT NOTE - NSBHASSESSSUMMFT_PSY_ALL_CORE
22 year old woman with history of bipolar disorder, transferred from bipolar clinic to [facility], unclear if dx changed to SAD.  Multiple prior admissions.  Has been noncompliant with Seroquel, increasingly delusions and having VH at home, not sleeping 5 nights in a row.  Directly admitted through [facility] after presenting unannounced for Crisis visit.  Will restart antipsychotic medication and with standing and PRN medication for sleep.  Diagnosis still unclear.  Thought blocking limits interview.  Patient may be in midst of depressive episode with psychosis vs psychotic episode with prominent negative symptoms, blunting of affect, etc.    >Legal: 9.13  >Obs: Routine, no current active SI. no need for CO  >Psychiatric Meds: Seroquel 50mg qHS standing, PRN for sleep.  Ativan PRN for anxiety  Zyprexa 5mg PO/IM for agitation  >Labs: Admission labs ordered including CBC, CMP.  >Medical:  No acute concerns.   >Social: milieu/structured therapy  >Treatment Interventions: Groups and Individual Therapy/CBT, Motivational counseling for substance abuse related issues.

## 2022-09-29 NOTE — BH INPATIENT PSYCHIATRY ASSESSMENT NOTE - DESCRIPTION
Single, only child. biological father living in [place]. Pt born in [place] and reports she migrated to [place] at age 9.  Mother remarried and current, Pt lives with mother and step father.  Step father is reportedly a Professor in Biology teaching at [employer]; mother is a soft ware .  Pt is currently a sophomore at [employer] taking Computer science.  She reports being interested in Languages.  Likes dancing, drawing, composing music.  she is not Jewish

## 2022-09-29 NOTE — BH INPATIENT PSYCHIATRY ASSESSMENT NOTE - RISK ASSESSMENT
RISK ASSESSMENT:   Modifiable risk factors:+ psychosis  Unmodifiable risk factors: hx of bipolar disorder, Pt is doing poorly, past hx of psych hosps, treatment noncompliance  Protective factors: no hx of SA or any self injurious behaviors, Domiciled and in college,   good support from family, no access to lethal means, no objective evidence of suicidality, no complex medical issues or chronic pains, no hx of violence or any pending legal cases, not intoxicated or in withdrawal, no family hx of SA    At this time given all risks taken into consideration, the Pt is at acute suicide risk (given mood lability + psychosis) as well as remaining at chronically elevated risk of harming self.  Pt is deemed NOT dischargeable back to the community given this current presentation.  Current increased in risks can be mitigated by a psychiatric admission with supervision, continuing psych meds with titration towards efficacious dosing range

## 2022-09-29 NOTE — BH PATIENT PROFILE - HOME MEDICATIONS
melatonin 3 mg oral tablet , 1 tab(s) orally once a day (at bedtime), As needed, Insomnia  LORazepam 1 mg oral tablet , 1 tab(s) orally once a day (at bedtime) MDD:1 mg  risperiDONE 1 mg oral tablet , 1 tab(s) orally once a day (at bedtime)  lithium 600 mg oral capsule , 1 cap(s) orally once a day (at bedtime)  benztropine 0.5 mg oral tablet , 1 tab(s) orally 2 times a day

## 2022-09-29 NOTE — CHART NOTE - NSCHARTNOTEFT_GEN_A_CORE
Screening Medical Evaluation    Patient Admitted from: Crisis center     [facility] admitting diagnosis: Bipolar disorder      PAST MEDICAL & SURGICAL HISTORY:  No pertinent past medical history      Bipolar disorder      No significant past surgical history            Allergies    No Known Allergies    Intolerances          Social History:       FAMILY HISTORY:        MEDICATIONS  (STANDING):  QUEtiapine 50 milliGRAM(s) Oral at bedtime    MEDICATIONS  (PRN):  LORazepam     Tablet 1 milliGRAM(s) Oral every 4 hours PRN anxiety/insomnia  melatonin. 3 milliGRAM(s) Oral at bedtime PRN Insomnia  OLANZapine 5 milliGRAM(s) Oral every 4 hours PRN agitation  OLANZapine Injectable 5 milliGRAM(s) IntraMuscular once PRN agitation  QUEtiapine 50 milliGRAM(s) Oral at bedtime PRN insomnia/psychosis        Vital Signs Last 24 Hrs  T(C): 36.6 (29 Sep 2022 16:19), Max: 36.6 (29 Sep 2022 16:19)  T(F): 97.8 (29 Sep 2022 16:19), Max: 97.8 (29 Sep 2022 16:19)  HR: -- 83b/ min   BP: --113/82  BP(mean): --  RR: 16 (29 Sep 2022 16:19) (16 - 16)  SpO2: 100% (29 Sep 2022 16:19) (100% - 100%)      CAPILLARY BLOOD GLUCOSE            PHYSICAL EXAM:  GENERAL: NAD, well-developed  HEAD:  Atraumatic, Normocephalic  EYES: EOMI, conjunctiva and sclera clear  NECK: Supple, No JVD  CHEST/LUNG: Clear to auscultation bilaterally; No wheeze  HEART: Regular rate and rhythm; No murmurs, rubs, or gallops  ABDOMEN: Positive BS, Soft, Nontender, Nondistended.   EXTREMITIES:  2+ Peripheral Pulses, No clubbing, cyanosis, or edema  PSYCH: AAOx3  NEUROLOGY: non-focal  SKIN: No rashes or lesions observed on visualized skin.     LABS:  COVID PCR : Not detected                   RADIOLOGY & ADDITIONAL TESTS:      Assessment and Plan:  22F admitted to [facility] for Bipolar disorder. No PMHx. Pt seen for medical screening evaluation. Patient has no acute complaints at this time. Patient denies fever, chills, headache, dizziness, lightheadedness, N/V, SOB, cough, congestion, chest pain, abdominal pain, dysuria, hematuria, diarrhea, constipation. Physical exam unremarkable, VSS. Labs as above. EKG _____.     1.)   2.)  3.) Screening Medical Evaluation    Patient Admitted from: Crisis center     [facility] admitting diagnosis: Bipolar disorder      PAST MEDICAL & SURGICAL HISTORY:  No pertinent past medical history      Bipolar disorder      No significant past surgical history            Allergies    No Known Allergies    Intolerances          Social History:       FAMILY HISTORY:        MEDICATIONS  (STANDING):  QUEtiapine 50 milliGRAM(s) Oral at bedtime    MEDICATIONS  (PRN):  LORazepam     Tablet 1 milliGRAM(s) Oral every 4 hours PRN anxiety/insomnia  melatonin. 3 milliGRAM(s) Oral at bedtime PRN Insomnia  OLANZapine 5 milliGRAM(s) Oral every 4 hours PRN agitation  OLANZapine Injectable 5 milliGRAM(s) IntraMuscular once PRN agitation  QUEtiapine 50 milliGRAM(s) Oral at bedtime PRN insomnia/psychosis        Vital Signs Last 24 Hrs  T(C): 36.6 (29 Sep 2022 16:19), Max: 36.6 (29 Sep 2022 16:19)  T(F): 97.8 (29 Sep 2022 16:19), Max: 97.8 (29 Sep 2022 16:19)  HR: -- 83b/ min   BP: --113/82  BP(mean): --  RR: 16 (29 Sep 2022 16:19) (16 - 16)  SpO2: 100% (29 Sep 2022 16:19) (100% - 100%)      CAPILLARY BLOOD GLUCOSE            PHYSICAL EXAM:  GENERAL: NAD, well-developed  HEAD:  Atraumatic, Normocephalic  EYES: Wears glasses, EOMI, conjunctiva and sclera clear  NECK: Supple, No JVD  CHEST/LUNG: Clear to auscultation bilaterally; No wheeze  HEART: Regular rate and rhythm; No murmurs, rubs, or gallops  ABDOMEN: Positive BS, Soft, Nontender, Nondistended.   EXTREMITIES:  2+ Peripheral Pulses, No clubbing, cyanosis, or edema  PSYCH: AAOx3  NEUROLOGY: non-focal  SKIN: No rashes or lesions observed on visualized skin.     LABS:  COVID PCR : Not detected                   RADIOLOGY & ADDITIONAL TESTS:      Assessment and Plan:  22F admitted to [facility] for Bipolar disorder. No PMHx. Pt seen for medical screening evaluation. Patient has no acute complaints at this time. Patient denies fever, chills, headache, dizziness, lightheadedness, N/V, SOB, cough, congestion, chest pain, abdominal pain, dysuria, hematuria, diarrhea, constipation. Physical exam unremarkable, VSS. Labs as above. 9/9/22 EKG : NSR @ 81b/ min, QT/ QTC= 388/ 450.     1.) Bipolar disorder: Plan per primary team.

## 2022-09-29 NOTE — BH INPATIENT PSYCHIATRY ASSESSMENT NOTE - CURRENT MEDICATION
MEDICATIONS  (STANDING):  LORazepam     Tablet 1 milliGRAM(s) Oral every 4 hours  QUEtiapine 50 milliGRAM(s) Oral at bedtime    MEDICATIONS  (PRN):  melatonin. 3 milliGRAM(s) Oral at bedtime PRN Insomnia  OLANZapine 5 milliGRAM(s) Oral every 4 hours PRN agitation  OLANZapine Injectable 5 milliGRAM(s) IntraMuscular once PRN agitation  QUEtiapine 50 milliGRAM(s) Oral at bedtime PRN insomnia/psychosis

## 2022-09-29 NOTE — BH PATIENT PROFILE - NSPROEDALEARNPREF_GEN_A_NUR
verbal instruction I have reviewed and confirmed nurses' notes for patient's medications, allergies, medical history, and surgical history.

## 2022-09-29 NOTE — BH INPATIENT PSYCHIATRY ASSESSMENT NOTE - NSSUICPROTFACT_PSY_ALL_CORE
Identifies reasons for living/Supportive social network of family or friends/Engaged in work or school/Holiness beliefs

## 2022-09-29 NOTE — BH INPATIENT PSYCHIATRY ASSESSMENT NOTE - HPI (INCLUDE ILLNESS QUALITY, SEVERITY, DURATION, TIMING, CONTEXT, MODIFYING FACTORS, ASSOCIATED SIGNS AND SYMPTOMS)
21 yo woman, single ,domiciled with family, student at [employer], in care of ETP currently, [facility] following her third psych hospitalization at [facility] from 4/1/21-4/29/21 for a manic episode. Pt has a PPH of Bipolar Disorder vs SAD, hx of two other inpt hospitalizations at [facility] in 2017 and 2019, hx of medication and tx noncompliance, no hx of suicide attempts/NSSIB, no legal hx, no hx of aggression/violence, and no hx of substance use.    [facility]  On presentation, patient is severely thought blocked with significant latency.  She tells RN and I that she has not been sleeping for several days.  Has not been taking prescribed Seroquel because she "does not want to" and lithium was discontinued shortly before that.  Patient denies physical complaints.  Denies chest pain, headache, and all other systems reviewed negative.  She endorses feeling depressed.  States she was having thoughts and visions of the Holy Spirit.  Cannot answer questions about medical history, allergies, and this information is obtained from the chart.      Confirmed the findings of ETP visit today: "Patient was brought in today by her parents, since she has not been sleeping over the last 5 days and started being anxious, with disorganized speech and behavior since yesterday.  On MSE Pt is anxious, with poor eye contact, with speech latency, severe thought blocks with poverty of thought and speech, PMR+, depressed mood with SI and plan, unclear if responding to internal stimuli, poor insight and judgement.  Patient initially requests to be interviewed alone, later asked if her parents could join since she could not express her thoughts. After questioned about her mood reports "I don't like the reality" and that she cannot differentiate with reality and dream. reports "I have delusion about [employer]" but cannot elaborate further. Reports "I am useless" and "a trouble for my parents." Endorses having SI with the plan to "run and got hit by cars" last night. Initially reports that she has not been taking any of her medications for the last week, later states that she might have taken and forgot. Reports that she has not been sleeping over the last 5 nights. Sleep report on her Fitbit hood shows ~1 hour sleep per night over the last 5 nights. Reports poor appetite.      Parents (father: Lucian [phone]) confirm the history stated above. They report that she "hasn't been herself" since yesterday and that yesterday she reported seeing "the holy spirit" in her room. They cannot confirm if she has been taking any of her medications or not. Different treatment options discussed with patient and her parents and they agree with the plan of hospital admission, on voluntary status, for depressive mood and worsened psychosis(disorganized speech, behavior, VH)."    Per outpatient record: "Most recent Rx Plan on 9/22/22: [place] 600mg po at HS discontinued as per patient's request. Seroquel increased to 50mg po at HS. Propranolol 10mg once daily for anxiety."

## 2022-09-29 NOTE — BH INPATIENT PSYCHIATRY ASSESSMENT NOTE - OTHER PAST PSYCHIATRIC HISTORY (INCLUDE DETAILS REGARDING ONSET, COURSE OF ILLNESS, INPATIENT/OUTPATIENT TREATMENT)
Admitted for anson to [facility] 4/1/21    hx of Bipolar disorder  [facility] ([facility]8/27/[facility]) for management of manic episode.       [facility] hospitalization (12/22-12/28/2017)   - past discharge diagnosis: adjustment disorder with depressed mood  - per chart review, Parents refused consent for SSRI during that admission  Pt followed up with therapist Ms Rosa Chowdhury; also saw a psychiatrist once at [facility] [place] soon after her [facility] discharge and was determined no psychotropics needed. Only recommended to start light and vitamin therapy during that encounter     no documented hx of SA

## 2022-09-29 NOTE — BH PATIENT PROFILE - STATED REASON FOR ADMISSION
Lasix, Solu-Medrol
"I haven't slept much and have been having delusions. I think I have illogical and disorganized thoughts."

## 2022-09-29 NOTE — BH PATIENT PROFILE - 
ADDITIONAL INFORMATION
Pt was unable to specify date of vaccinations and stated, "I'm not sure when it was. It was awhile ago"

## 2022-09-30 LAB
A1C WITH ESTIMATED AVERAGE GLUCOSE RESULT: 5.3 % — SIGNIFICANT CHANGE UP (ref 4–5.6)
ESTIMATED AVERAGE GLUCOSE: 105 — SIGNIFICANT CHANGE UP
TSH SERPL-MCNC: 3.46 UIU/ML — SIGNIFICANT CHANGE UP (ref 0.27–4.2)

## 2022-09-30 PROCEDURE — 90853 GROUP PSYCHOTHERAPY: CPT

## 2022-09-30 PROCEDURE — 99231 SBSQ HOSP IP/OBS SF/LOW 25: CPT

## 2022-09-30 RX ORDER — LITHIUM CARBONATE 300 MG/1
450 TABLET, EXTENDED RELEASE ORAL AT BEDTIME
Refills: 0 | Status: DISCONTINUED | OUTPATIENT
Start: 2022-09-30 | End: 2022-10-12

## 2022-09-30 RX ADMIN — QUETIAPINE FUMARATE 50 MILLIGRAM(S): 200 TABLET, FILM COATED ORAL at 23:28

## 2022-09-30 RX ADMIN — Medication 3 MILLIGRAM(S): at 21:38

## 2022-09-30 RX ADMIN — Medication 1 MILLIGRAM(S): at 02:30

## 2022-09-30 RX ADMIN — LITHIUM CARBONATE 450 MILLIGRAM(S): 300 TABLET, EXTENDED RELEASE ORAL at 21:38

## 2022-09-30 RX ADMIN — QUETIAPINE FUMARATE 50 MILLIGRAM(S): 200 TABLET, FILM COATED ORAL at 21:39

## 2022-09-30 RX ADMIN — Medication 1 MILLIGRAM(S): at 23:28

## 2022-09-30 NOTE — PSYCHIATRIC REHAB INITIAL EVALUATION - NSBHALCSUBTREAT_PSY_ALL_CORE
Pt is currently in outpatient treatment at [facility]./Outpatient clinic (specify)

## 2022-09-30 NOTE — BH SOCIAL WORK INITIAL PSYCHOSOCIAL EVALUATION - NSBHCHILDEVENTS_PSY_ALL_CORE
Pt moved to [facility] at age 9, to live with mother and step father/Remarriage of parents/Other (specify) no

## 2022-09-30 NOTE — BH TREATMENT PLAN - NSTXPSYCHOINTERPR_PSY_ALL_CORE
Patient will benefit from demonstrating medication compliance and engaging in individual and group sessions in order to identify and utilize healthy coping skills for improved symptom management and sustained recovery by day seven. Psychiatric Rehabilitation staff will engage patient daily in order to assist patient in exploring various coping strategies.

## 2022-09-30 NOTE — BH TREATMENT PLAN - NSTXDCOPNOINTERSW_PSY_ALL_CORE
SW reviewed EMR, met with pt, gained consent to speak with family and outpt provider. SW met with team to assess pt progress and tx options.

## 2022-09-30 NOTE — PSYCHIATRIC REHAB INITIAL EVALUATION - NSBHPRRECOMMEND_PSY_ALL_CORE
Writer met with patient to orient to unit and introduce self, psychiatric rehabilitation staff and department functions. Patient was provided a copy of the unit schedule. On interview, patient was polite and cooperative. Patient presented with fair ADL’s and appropriate contact. Patient demonstrated poor insight and judgement into her symptoms and treatment at this time. Writer encouraged patient to attend psychiatric rehabilitation groups and engage in treatment. Writer and patient were able to establish a collaborative psychiatric rehabilitation goal. Psychiatric Rehabilitation staff will continue to engage patient daily in order to develop therapeutic rapport.

## 2022-09-30 NOTE — BH SOCIAL WORK INITIAL PSYCHOSOCIAL EVALUATION - NSBHEDUCURSCHOOL_PSY_ALL_CORE
[facility] is calling in regarding to patients hydrocortisone (CORTEF) 10 MG tablet. Stats it is on back order and can not be refilled. Pharmacy would like to be called back in regards to medication.       Return call to [phone]  Reference # [id number]   Yes

## 2022-10-01 RX ADMIN — QUETIAPINE FUMARATE 50 MILLIGRAM(S): 200 TABLET, FILM COATED ORAL at 21:44

## 2022-10-01 RX ADMIN — Medication 1 MILLIGRAM(S): at 10:10

## 2022-10-01 RX ADMIN — LITHIUM CARBONATE 450 MILLIGRAM(S): 300 TABLET, EXTENDED RELEASE ORAL at 21:44

## 2022-10-02 RX ADMIN — Medication 3 MILLIGRAM(S): at 20:31

## 2022-10-02 RX ADMIN — Medication 1 MILLIGRAM(S): at 23:20

## 2022-10-02 RX ADMIN — LITHIUM CARBONATE 450 MILLIGRAM(S): 300 TABLET, EXTENDED RELEASE ORAL at 20:31

## 2022-10-02 RX ADMIN — QUETIAPINE FUMARATE 50 MILLIGRAM(S): 200 TABLET, FILM COATED ORAL at 20:31

## 2022-10-03 PROCEDURE — 90853 GROUP PSYCHOTHERAPY: CPT

## 2022-10-03 PROCEDURE — 99231 SBSQ HOSP IP/OBS SF/LOW 25: CPT

## 2022-10-03 RX ORDER — OLANZAPINE 15 MG/1
5 TABLET, FILM COATED ORAL AT BEDTIME
Refills: 0 | Status: DISCONTINUED | OUTPATIENT
Start: 2022-10-03 | End: 2022-10-04

## 2022-10-03 RX ADMIN — LITHIUM CARBONATE 450 MILLIGRAM(S): 300 TABLET, EXTENDED RELEASE ORAL at 21:25

## 2022-10-03 RX ADMIN — OLANZAPINE 5 MILLIGRAM(S): 15 TABLET, FILM COATED ORAL at 21:25

## 2022-10-03 RX ADMIN — Medication 100 MILLIGRAM(S): at 22:42

## 2022-10-04 PROCEDURE — 90853 GROUP PSYCHOTHERAPY: CPT

## 2022-10-04 PROCEDURE — 99232 SBSQ HOSP IP/OBS MODERATE 35: CPT

## 2022-10-04 RX ORDER — OLANZAPINE 15 MG/1
10 TABLET, FILM COATED ORAL AT BEDTIME
Refills: 0 | Status: DISCONTINUED | OUTPATIENT
Start: 2022-10-04 | End: 2022-10-12

## 2022-10-04 RX ADMIN — Medication 1 MILLIGRAM(S): at 08:15

## 2022-10-04 RX ADMIN — LITHIUM CARBONATE 450 MILLIGRAM(S): 300 TABLET, EXTENDED RELEASE ORAL at 21:46

## 2022-10-04 RX ADMIN — OLANZAPINE 10 MILLIGRAM(S): 15 TABLET, FILM COATED ORAL at 21:46

## 2022-10-05 LAB
ALBUMIN SERPL ELPH-MCNC: 4.6 G/DL — SIGNIFICANT CHANGE UP (ref 3.3–5)
ALP SERPL-CCNC: 54 U/L — SIGNIFICANT CHANGE UP (ref 40–120)
ALT FLD-CCNC: 12 U/L — SIGNIFICANT CHANGE UP (ref 4–33)
ANION GAP SERPL CALC-SCNC: 9 MMOL/L — SIGNIFICANT CHANGE UP (ref 7–14)
AST SERPL-CCNC: 19 U/L — SIGNIFICANT CHANGE UP (ref 4–32)
BASOPHILS # BLD AUTO: 0.02 K/UL — SIGNIFICANT CHANGE UP (ref 0–0.2)
BASOPHILS NFR BLD AUTO: 0.3 % — SIGNIFICANT CHANGE UP (ref 0–2)
BILIRUB SERPL-MCNC: 0.3 MG/DL — SIGNIFICANT CHANGE UP (ref 0.2–1.2)
BUN SERPL-MCNC: 12 MG/DL — SIGNIFICANT CHANGE UP (ref 7–23)
CALCIUM SERPL-MCNC: 9.8 MG/DL — SIGNIFICANT CHANGE UP (ref 8.4–10.5)
CHLORIDE SERPL-SCNC: 108 MMOL/L — HIGH (ref 98–107)
CO2 SERPL-SCNC: 23 MMOL/L — SIGNIFICANT CHANGE UP (ref 22–31)
CREAT SERPL-MCNC: 0.7 MG/DL — SIGNIFICANT CHANGE UP (ref 0.5–1.3)
EGFR: 126 ML/MIN/1.73M2 — SIGNIFICANT CHANGE UP
EOSINOPHIL # BLD AUTO: 0.2 K/UL — SIGNIFICANT CHANGE UP (ref 0–0.5)
EOSINOPHIL NFR BLD AUTO: 2.6 % — SIGNIFICANT CHANGE UP (ref 0–6)
GLUCOSE SERPL-MCNC: 125 MG/DL — HIGH (ref 70–99)
HCT VFR BLD CALC: 40.6 % — SIGNIFICANT CHANGE UP (ref 34.5–45)
HGB BLD-MCNC: 13.3 G/DL — SIGNIFICANT CHANGE UP (ref 11.5–15.5)
IANC: 4.39 K/UL — SIGNIFICANT CHANGE UP (ref 1.8–7.4)
IMM GRANULOCYTES NFR BLD AUTO: 0.5 % — SIGNIFICANT CHANGE UP (ref 0–0.9)
LITHIUM SERPL-MCNC: 0.5 MMOL/L — LOW (ref 0.6–1.2)
LYMPHOCYTES # BLD AUTO: 2.29 K/UL — SIGNIFICANT CHANGE UP (ref 1–3.3)
LYMPHOCYTES # BLD AUTO: 30.1 % — SIGNIFICANT CHANGE UP (ref 13–44)
MCHC RBC-ENTMCNC: 29.4 PG — SIGNIFICANT CHANGE UP (ref 27–34)
MCHC RBC-ENTMCNC: 32.8 GM/DL — SIGNIFICANT CHANGE UP (ref 32–36)
MCV RBC AUTO: 89.6 FL — SIGNIFICANT CHANGE UP (ref 80–100)
MONOCYTES # BLD AUTO: 0.68 K/UL — SIGNIFICANT CHANGE UP (ref 0–0.9)
MONOCYTES NFR BLD AUTO: 8.9 % — SIGNIFICANT CHANGE UP (ref 2–14)
NEUTROPHILS # BLD AUTO: 4.39 K/UL — SIGNIFICANT CHANGE UP (ref 1.8–7.4)
NEUTROPHILS NFR BLD AUTO: 57.6 % — SIGNIFICANT CHANGE UP (ref 43–77)
NRBC # BLD: 0 /100 WBCS — SIGNIFICANT CHANGE UP (ref 0–0)
NRBC # FLD: 0 K/UL — SIGNIFICANT CHANGE UP (ref 0–0)
PLATELET # BLD AUTO: 338 K/UL — SIGNIFICANT CHANGE UP (ref 150–400)
POTASSIUM SERPL-MCNC: 3.9 MMOL/L — SIGNIFICANT CHANGE UP (ref 3.5–5.3)
POTASSIUM SERPL-SCNC: 3.9 MMOL/L — SIGNIFICANT CHANGE UP (ref 3.5–5.3)
PROT SERPL-MCNC: 7.4 G/DL — SIGNIFICANT CHANGE UP (ref 6–8.3)
RBC # BLD: 4.53 M/UL — SIGNIFICANT CHANGE UP (ref 3.8–5.2)
RBC # FLD: 12.9 % — SIGNIFICANT CHANGE UP (ref 10.3–14.5)
SODIUM SERPL-SCNC: 140 MMOL/L — SIGNIFICANT CHANGE UP (ref 135–145)
WBC # BLD: 7.62 K/UL — SIGNIFICANT CHANGE UP (ref 3.8–10.5)
WBC # FLD AUTO: 7.62 K/UL — SIGNIFICANT CHANGE UP (ref 3.8–10.5)

## 2022-10-05 PROCEDURE — 99232 SBSQ HOSP IP/OBS MODERATE 35: CPT

## 2022-10-05 PROCEDURE — 90853 GROUP PSYCHOTHERAPY: CPT

## 2022-10-05 PROCEDURE — 90832 PSYTX W PT 30 MINUTES: CPT | Mod: 59

## 2022-10-05 RX ADMIN — OLANZAPINE 10 MILLIGRAM(S): 15 TABLET, FILM COATED ORAL at 20:27

## 2022-10-05 RX ADMIN — Medication 3 MILLIGRAM(S): at 20:28

## 2022-10-05 RX ADMIN — LITHIUM CARBONATE 450 MILLIGRAM(S): 300 TABLET, EXTENDED RELEASE ORAL at 20:27

## 2022-10-06 PROCEDURE — 99231 SBSQ HOSP IP/OBS SF/LOW 25: CPT

## 2022-10-06 PROCEDURE — 90853 GROUP PSYCHOTHERAPY: CPT

## 2022-10-06 RX ADMIN — OLANZAPINE 10 MILLIGRAM(S): 15 TABLET, FILM COATED ORAL at 20:50

## 2022-10-06 RX ADMIN — LITHIUM CARBONATE 450 MILLIGRAM(S): 300 TABLET, EXTENDED RELEASE ORAL at 20:50

## 2022-10-06 NOTE — BH INPATIENT PSYCHIATRY PROGRESS NOTE - NSBHMSESPABN_PSY_A_CORE
Spoke with patient, aware of phyiscal therapy and has an appt scheduled 
hyperverbal/Soft volume/Slowed rate

## 2022-10-07 PROCEDURE — 99231 SBSQ HOSP IP/OBS SF/LOW 25: CPT

## 2022-10-07 PROCEDURE — 90832 PSYTX W PT 30 MINUTES: CPT | Mod: 59

## 2022-10-07 PROCEDURE — 90853 GROUP PSYCHOTHERAPY: CPT

## 2022-10-07 RX ADMIN — LITHIUM CARBONATE 450 MILLIGRAM(S): 300 TABLET, EXTENDED RELEASE ORAL at 20:20

## 2022-10-07 RX ADMIN — OLANZAPINE 10 MILLIGRAM(S): 15 TABLET, FILM COATED ORAL at 20:20

## 2022-10-07 RX ADMIN — Medication 1 MILLIGRAM(S): at 18:58

## 2022-10-08 RX ADMIN — LITHIUM CARBONATE 450 MILLIGRAM(S): 300 TABLET, EXTENDED RELEASE ORAL at 20:38

## 2022-10-08 RX ADMIN — OLANZAPINE 10 MILLIGRAM(S): 15 TABLET, FILM COATED ORAL at 20:39

## 2022-10-09 RX ADMIN — LITHIUM CARBONATE 450 MILLIGRAM(S): 300 TABLET, EXTENDED RELEASE ORAL at 20:36

## 2022-10-09 RX ADMIN — Medication 1 MILLIGRAM(S): at 20:37

## 2022-10-09 RX ADMIN — OLANZAPINE 10 MILLIGRAM(S): 15 TABLET, FILM COATED ORAL at 20:36

## 2022-10-09 RX ADMIN — Medication 3 MILLIGRAM(S): at 20:37

## 2022-10-10 PROCEDURE — 90832 PSYTX W PT 30 MINUTES: CPT | Mod: 59

## 2022-10-10 PROCEDURE — 99231 SBSQ HOSP IP/OBS SF/LOW 25: CPT

## 2022-10-10 PROCEDURE — 90853 GROUP PSYCHOTHERAPY: CPT

## 2022-10-10 RX ADMIN — OLANZAPINE 10 MILLIGRAM(S): 15 TABLET, FILM COATED ORAL at 21:30

## 2022-10-10 RX ADMIN — LITHIUM CARBONATE 450 MILLIGRAM(S): 300 TABLET, EXTENDED RELEASE ORAL at 21:30

## 2022-10-10 NOTE — BH PSYCHOLOGY - CLINICIAN PSYCHOTHERAPY NOTE - NSBHPSYCHOLGOALS_PSY_A_CORE
Improve level of independent functioning/Improve social/vocational/coping skills/Psychoeducation/Treatment compliance
other...
Assessment/Improve level of independent functioning/Improve social/vocational/coping skills/Psychoeducation/Treatment compliance

## 2022-10-10 NOTE — BH PSYCHOLOGY - CLINICIAN PSYCHOTHERAPY NOTE - NSBHPSYCHOLNARRATIVE_PSY_A_CORE FT
Patient presented calm, cooperative, polite, better related, and motivated for the session. Hygiene and grooming were fair. Mood was euthymic, stable, and her affect was bright and remained within normal limits. Thought process was linear, organized and goal-directed. Speech was coherent and of normal pace and volume. No perceptual disturbances were reported or observed. She denied suicidal or homicidal ideation, plan or intent. Insight and judgment were fair.     Patient and writer met for a 30-minute individual therapy session. Patient reported feeling better from admission. She specified feeling more balanced emotionally, getting more consistent sleep, producing thoughts and feeling less confused. She attributed the changes to being in the milieu around others and the medication. Patient expressed wanting to focus on her being too tough on herself. She explained that she has a tendency to hold full responsibility when things go wrong in her life. Writer validated and reflected patient's associated emotion states, and dyad discussed ways to catch these thoughts, evaluate them and then positively reframe as needed. She also generated positive affirmations she could speak to herself during these times as well. Dyad then discussed patient's values. Psychoeducation was provided on values, how they can provide direction in life, and how remaining in contact with your values can reinforce a sense of self. Patient was given a Weekend Wellness Booklet at the end of session to work through over the weekend.   
Patient presented calm, cooperative, engaged and motivated to meet for session. Hygiene and grooming were fair. Mood was euthymic and affect congruent. Thought process was linear, logical and goal-directed. Speech was clear, coherent and of regular volume and slowed pace. No perceptual disturbances were reported or observed. She denied active suicidal or homicidal ideation, plan or intent. Insight and judgment were fair.     Patient and writer met for a 30-minute individual therapy session. Patient reported having a "good" weekend. Dyad discussed patient's utilization of the Weekend Wellness Workbook. Patient meaningfully completed certain exercises in the workbook that focused on the Acceptance and Commitment Therapy concepts of Acceptance and Defusion. Patient discussed her journey to accepting her diagnosis and need for treatment. She expressed initial worry about losing her good mood, energy and creativity with treatment; however, now understands how her risk elevates without treatment. Writer supported patient's insights and reasoning abilities. Patient reported feeling better in the hospital because she is able to function more independently than she does at home with her parents. Writer provided education on the benefits of finding and sustaining a balance of independence and dependence. Patient was open and receptive to education provided. 
Patient presented alert, calm, cooperative, polite, engaged and motivated to meet for session. Hygiene and grooming were good. Mood was predominately euthymic with moment of appropriate sadness, and affect was mood congruent. Thought process was linear, organized and goal-directed. Speech was clear, coherent and of normal pace and volume. No perceptual disturbances were reported or observed. She denied active suicidal or homicidal ideation, plan or intent. Insight and judgment were fair.     Patient and writer met for their first individual therapy session. Patient utilized the therapy space meaningfully. She identified two areas of focus for her treatment: 1) Her emotions and 2) her next steps in life post discharge. She discussed the events and experiences that led to her decision to take a leave of absence from school. She reported having difficulty balancing her mental health needs (i.e., anxiety) and the academic demands of school. She mentioned experiencing pressure from her mother to succeed academically. Dyad explored patient's sense of her mother's understanding of and perspective on mental health through a culture and transgenerational lens. Dyad also explored patient's values and core sense of self. Patient endorsed a "mimic" approach to relationships with others driven by the desire to relate and connect. Writer provided psychoeducation and support therapeutic techniques with good effect.

## 2022-10-10 NOTE — BH PSYCHOLOGY - CLINICIAN PSYCHOTHERAPY NOTE - TOKEN PULL-DIAGNOSIS
Primary Diagnosis:  Moderate bipolar I disorder with anson as current episode [F31.12]      Acute psychosis [F23]        Problem Dx:   

## 2022-10-10 NOTE — BH PSYCHOLOGY - CLINICIAN PSYCHOTHERAPY NOTE - NSBHPSYCHOLGOALS_PSY_A_CORE FT
Decrease symptoms, Develop cognitive/emotion regulation skills, improve insight from psychoeducation

## 2022-10-10 NOTE — BH PSYCHOLOGY - CLINICIAN PSYCHOTHERAPY NOTE - NSBHPSYCHOLRESPONSE_PSY_A_CORE
Symptoms reduced/Coping skills acquired/Insight displayed/Accepted support

## 2022-10-10 NOTE — BH PSYCHOLOGY - CLINICIAN PSYCHOTHERAPY NOTE - NSBHPSYCHOLINT_PSY_A_CORE
Dynamic issues addressed/Supported coping skills/Supportive therapy/Treatment compliance encouraged
Cognitive/behavioral therapy/Supported coping skills/Supportive therapy/Treatment compliance encouraged
Cognitive/behavioral therapy/Dynamic issues addressed/Supported coping skills/Supportive therapy

## 2022-10-11 LAB
ANION GAP SERPL CALC-SCNC: 9 MMOL/L — SIGNIFICANT CHANGE UP (ref 7–14)
BUN SERPL-MCNC: 10 MG/DL — SIGNIFICANT CHANGE UP (ref 7–23)
CALCIUM SERPL-MCNC: 9.7 MG/DL — SIGNIFICANT CHANGE UP (ref 8.4–10.5)
CHLORIDE SERPL-SCNC: 106 MMOL/L — SIGNIFICANT CHANGE UP (ref 98–107)
CO2 SERPL-SCNC: 25 MMOL/L — SIGNIFICANT CHANGE UP (ref 22–31)
CREAT SERPL-MCNC: 0.83 MG/DL — SIGNIFICANT CHANGE UP (ref 0.5–1.3)
EGFR: 103 ML/MIN/1.73M2 — SIGNIFICANT CHANGE UP
GLUCOSE SERPL-MCNC: 79 MG/DL — SIGNIFICANT CHANGE UP (ref 70–99)
LITHIUM SERPL-MCNC: 0.4 MMOL/L — LOW (ref 0.6–1.2)
POTASSIUM SERPL-MCNC: 4 MMOL/L — SIGNIFICANT CHANGE UP (ref 3.5–5.3)
POTASSIUM SERPL-SCNC: 4 MMOL/L — SIGNIFICANT CHANGE UP (ref 3.5–5.3)
SODIUM SERPL-SCNC: 140 MMOL/L — SIGNIFICANT CHANGE UP (ref 135–145)

## 2022-10-11 PROCEDURE — 99232 SBSQ HOSP IP/OBS MODERATE 35: CPT

## 2022-10-11 PROCEDURE — 90853 GROUP PSYCHOTHERAPY: CPT

## 2022-10-11 RX ORDER — LITHIUM CARBONATE 300 MG/1
1 TABLET, EXTENDED RELEASE ORAL
Qty: 14 | Refills: 0
Start: 2022-10-11 | End: 2022-10-24

## 2022-10-11 RX ORDER — OLANZAPINE 15 MG/1
1 TABLET, FILM COATED ORAL
Qty: 14 | Refills: 0
Start: 2022-10-11 | End: 2022-10-24

## 2022-10-11 RX ADMIN — LITHIUM CARBONATE 450 MILLIGRAM(S): 300 TABLET, EXTENDED RELEASE ORAL at 20:34

## 2022-10-11 RX ADMIN — OLANZAPINE 10 MILLIGRAM(S): 15 TABLET, FILM COATED ORAL at 20:33

## 2022-10-11 NOTE — BH PSYCHOLOGY - GROUP THERAPY NOTE - NSBHPTASSESSDT_PSY_A_CORE
10-Oct-2022 11:15
11-Oct-2022 11:15
07-Oct-2022 14:15
04-Oct-2022 11:15
30-Sep-2022 14:15
06-Oct-2022 11:15
03-Oct-2022 11:15
05-Oct-2022 14:15

## 2022-10-11 NOTE — BH PSYCHOLOGY - GROUP THERAPY NOTE - NSPSYCHOLGRPCOGSPCH_PSY_A_CORE FT
Clear, coherent and of normal pace and volume. 
Notable accent but coherent 
Within Normal Limits 
Clear and coherent 
Clear, coherent and of normal pace and volume
Clear, coherent and of normal pace and volume 
Clear and coherent 
Clear, coherent and of slowed pace and normal volume.

## 2022-10-11 NOTE — BH PSYCHOLOGY - GROUP THERAPY NOTE - NSPSYCHOLGRPCOGPT_PSY_A_CORE FT
Patient attended Cognitive Behavioral Therapy Group. Acceptance and Commitment Therapy principles, concepts, skills and techniques were also utilized in the group. The group started with group rules, introductions and brief individual check-ins. The topics discussed in group were: Acceptance, Choice Points, and "Toward and Away" moves.  facilitated the group process, as well as provided support and psychoeducation. 
Patient attended Cognitive Behavioral Therapy Group. Acceptance and Commitment Therapy principles, concepts, skills and techniques were also utilized in the group. The group started with group rules, introductions and brief individual check-ins. The topics discussed in group were: Wellness.  facilitated the group process, as well as provided support and psychoeducation. 
Patient attended Cognitive Behavioral Therapy Group. Acceptance and Commitment Therapy principles, concepts, skills and techniques were also utilized in the group. The group started with group rules, introductions and brief individual check-ins. The topics discussed in group were: Effective Communication.  facilitated the group process, as well as provided support and psychoeducation. 
Patient attended Cognitive Behavioral Therapy Group. Acceptance and Commitment Therapy principles, concepts, skills and techniques were also utilized in the group. The group started with group rules, introductions and brief individual check-ins. The topics discussed in group were: Overthinking and Defusion.  facilitated the group process, as well as provided support and psychoeducation. 
Patient attended Cognitive Behavioral Therapy Group. Acceptance and Commitment Therapy principles, concepts, skills and techniques were also utilized in the group. The group started with group rules, introductions and brief individual check-ins. The topics discussed in group were: Mental Health Disorders and Treatment.  facilitated the group process, as well as provided support and psychoeducation. 
Patient attended Cognitive Behavioral Therapy Group. Acceptance and Commitment Therapy principles, concepts, skills and techniques were also utilized in the group. The group started with group rules, introductions and brief individual check-ins. The topics discussed in group were: Overthinking, Defusion, and Unhelpful Thinking Styles.  facilitated the group process, as well as provided support and psychoeducation. 
Patient attended Cognitive Behavioral Therapy Group. Acceptance and Commitment Therapy principles, concepts, skills and techniques were also utilized in the group. The group started with group rules, introductions and brief individual check-ins. The topics discussed in group were: Emotions.  facilitated the group process, as well as provided support and psychoeducation. 
Patient attended Cognitive Behavioral Therapy Group. Acceptance and Commitment Therapy principles, concepts, skills and techniques were also utilized in the group. The group started with group rules, introductions and brief individual check-ins. The topics discussed in group were: Negative Self-Talk and Cognitive Perceptions.  facilitated the group process, as well as provided support and psychoeducation.

## 2022-10-11 NOTE — BH PSYCHOLOGY - GROUP THERAPY NOTE - NSBHPSYCHOLGRPNAME_PSY_A_CORE
CBT (Cognitive Behavioral Therapy) Group

## 2022-10-11 NOTE — BH DISCHARGE NOTE NURSING/SOCIAL WORK/PSYCH REHAB - DISCHARGE INSTRUCTIONS AFTERCARE APPOINTMENTS
In order to check the location, date, or time of your aftercare appointment, please refer to your Discharge Instructions Document given to you upon leaving the hospital.  If you have lost the instructions please call [phone]

## 2022-10-11 NOTE — BH INPATIENT PSYCHIATRY PROGRESS NOTE - NSBHMSESPABN_PSY_A_CORE
hyperverbal/Slowed rate/Increased latency/Other
hyperverbal/Slowed rate
hyperverbal/Slowed rate/Increased latency
hyperverbal/Slowed rate/Other
hyperverbal/Slowed rate/Increased latency
hyperverbal/Slowed rate/Increased latency

## 2022-10-11 NOTE — BH PSYCHOLOGY - GROUP THERAPY NOTE - NSPSYCHOLGRPCOGPROB_PSY_A_CORE FT
Anxiety, Depression, Emotion Dysregulation, Poor Coping Skills, Psychosocial Stressors, Inadequate Self-Care, Ineffective Problem Solving Skills. 

## 2022-10-11 NOTE — BH DISCHARGE NOTE NURSING/SOCIAL WORK/PSYCH REHAB - PATIENT PORTAL LINK FT
You can access the FollowMyHealth Patient Portal offered by [facility] by registering at the following website: [email or web address]. By joining [employer]’s FollowMyHealth portal, you will also be able to view your health information using other applications (apps) compatible with our system.

## 2022-10-11 NOTE — BH PSYCHOLOGY - GROUP THERAPY NOTE - NSPSYCHOLGRPBILLING_PSY_A_CORE
[id number] - Group Psychotherapy
[id number] - Group Psychotherapy
[id number] - Group Psychotherapy
98409 - Group Psychotherapy
[id number] - Group Psychotherapy
29863 - Group Psychotherapy
68892 - Group Psychotherapy
[id number] - Group Psychotherapy

## 2022-10-11 NOTE — BH DISCHARGE NOTE NURSING/SOCIAL WORK/PSYCH REHAB - NSCDUDCCRISIS_PSY_A_CORE
[facility]  1 (441) [facility]-WELL ([phone])  Text "WELL" to 98029  Website: www.Journeys.Weimi/.National Suicide Prevention Lifeline 8 (850) 384-0143/.  [employer]  1 (869) LIFENET (276-2813)/.  [facility]  [address] [address] [phone]   Hours:  Monday through Friday from 9 AM to 3 PM/988 Suicide and Crisis Lifeline

## 2022-10-11 NOTE — BH PSYCHOLOGY - GROUP THERAPY NOTE - TOKEN PULL-DIAGNOSIS
Primary Diagnosis:  Moderate bipolar I disorder with anson as current episode [F31.12]      Acute psychosis [F23]        Problem Dx:   
Primary Diagnosis:  Acute psychosis [F23]        Problem Dx:   
Primary Diagnosis:  Moderate bipolar I disorder with anson as current episode [F31.12]      Acute psychosis [F23]        Problem Dx:

## 2022-10-11 NOTE — BH PSYCHOLOGY - GROUP THERAPY NOTE - NSBHPSYCHOLRESPONSE_PSY_A_CORE
Symptoms reduced/Coping skills acquired/Accepted support
Symptoms reduced/Coping skills acquired/Insight displayed/Accepted support
Symptoms reduced/Coping skills acquired/Accepted support
Symptoms reduced/Coping skills acquired/Insight displayed/Accepted support

## 2022-10-11 NOTE — BH PSYCHOLOGY - GROUP THERAPY NOTE - NSBHPSYCHOLGRPTYPE_PSY_A_CORE
Cognitive Behavioral Coping Skills

## 2022-10-11 NOTE — BH PSYCHOLOGY - GROUP THERAPY NOTE - NSBHPSYCHOLASSESSPROV_PSY_A_CORE
Licensed Psychologist

## 2022-10-11 NOTE — BH PSYCHOLOGY - GROUP THERAPY NOTE - NSPSYCHOLGRPCOGINT_PSY_A_CORE FT
Cognitive Behavioral Therapy, Acceptance and Commitment Therapy, Emotion Regulation/Coping Skills taught, Psychoeducation 

## 2022-10-11 NOTE — BH DISCHARGE NOTE NURSING/SOCIAL WORK/PSYCH REHAB - NSDCPRGOAL_PSY_ALL_CORE
Pt has demonstrated significant progress towards psychiatric rehabilitation goals during the current hospitalization. Pt is able to identify playing games, talking to someone, journaling, breathing, art, dancing, singing, listening to music, doing puzzles, and going for walks as healthy coping strategies to better manage symptoms. Pt endorses improvements in mood, sleep, appetite, and hopefulness for the future. Pt presents with more linear thought processing and demonstrates improved insight and judgement into symptoms and treatment. Pt denies any current AH/VH or paranoia. Pt also denies any current SI/HI, intent, or plan. Writer and pt engaged in safety planning, which can be reviewed in the  Safety Plan document. Pt was able to identify various warning signs, coping skills, and social supports to utilize after discharge. Pt has been compliant with medications and is tolerating them well. During the current hospitalization, pt attended approximately 95% of daily psychiatric rehabilitation groups. Pt was verbal in groups and participated appropriately. Pt did not require any redirection and was not a behavioral management issue on the unit. Pt has been visible in the milieu and demonstrated an increase in positive socialization with select peers. Pt is able to verbalize thoughts, needs, and feelings appropriately. Pt was provided with a Press Ganey survey to complete prior to discharge.

## 2022-10-11 NOTE — BH PSYCHOLOGY - GROUP THERAPY NOTE - NSBHPSYCHOLPARTICIPCOMMENT_PSY_A_CORE FT
Patient attended Psychology Group in its entirety, and actively and meaningfully participated in the group discussion on Overthinking and Defusion. Patient discussed her tendency to overthink. She was open and receptive to psychoeducation provided on ways to combat overthinking and avoid the rebound effect of restricting thoughts. She was supportive and respectful of peers. 
Patient attended Psychology Group in its entirety, and actively and meaningfully participated in the group discussion. She asked relevant and thoughtful questions about mental health disorders, and available and effective treatment modalities for these conditions. She shared her experiences of manic episodes. She was open and receptive to psychoeducation provided.  
Patient attend Psychology group. She actively and meaningfully participated in the group discussion on Negative Self-Talk and Cognitive Perceptions. She shared how holding onto negative thoughts can lead to holding onto negative feelings. She discussed how she "shoulds" and "musts" herself that lead to feeling shame and guilt. She was receptive to psychoeducation on cognitive reframing and defusion strategies. She provided positive support to others and resonated with their emotional experiences with good effect. 
Patient attended Psychology Group in its entirety and participated actively and meaningfully in the group discussion on Overthinking, Defusion and Unhelpful thinking styles. She provided the group with a recap of yesterday's group. She provided good support to peers in the group. She was receptive to psychoeducation provided by the . 
Patient attended Psychology Group, and actively and meaningfully participated in the group discussion on Emotions and the impact on labeling emotions. Patient labeled certain emotions either "positive" or "negative". She shared how she reacts to "negative" emotions vs "positive" emotions. She was receptive to psychoeducation on healthy perspectives on emotions such as emotion are neither good or bad, emotions are not facts, and the difference between having an emotion and acting on an emotion. She shared ways in which she positively channels her emotions through dance and other forms of artistic expression. 
Patient attended Psychology Group and actively participated in the group discussion on Wellness from an Acceptance and Commitment Therapy perspective. Patient shared her values. She asked relevant questions regarding how values provide guidance through life. She participated in the active listening during the presentation of the Weekend Wellness Booklet created by the Psychology Department. 
Patient attended Psychology group, and actively and meaningfully participated in the group discussion on Effective Communication. She shared that she does not believe she communicates well. She explained often feeling caught between saying what she wants to say and saying what she feels she should say. She was open and receptive to psychoeducation provided. She resonated with the experiences of peers in the groups. 
Patient attended Psychology Group in its entirety, and actively and meaningfully participated in the group discussion on Acceptance, Choice Points, and "Toward and Away" Behaviors. Patient shared a thoughtful and detailed description of who she aspires to be in life. She explained wanting to be part of a larger community, be a kind person, work in a profession that helps others, perform in some capacity in the arts, and be in a romantic relationship with a significant other. She asked how relevant questions regarding making "toward" moves with anxiety. She connected with a peer on their interest in music. She was open and receptive to psychoeducation provided on acceptance.

## 2022-10-11 NOTE — BH DISCHARGE NOTE NURSING/SOCIAL WORK/PSYCH REHAB - NSDCPRRECOMMEND_PSY_ALL_CORE
Psychiatric Rehabilitation staff recommends that patient continues outpatient treatment [facility] for ongoing medication management, support, and psychotherapy. In addition to, continuing exploring and utilizing healthy coping strategies for improved symptom management and sustained recovery.

## 2022-10-12 VITALS — SYSTOLIC BLOOD PRESSURE: 116 MMHG | TEMPERATURE: 97 F | HEART RATE: 80 BPM | DIASTOLIC BLOOD PRESSURE: 75 MMHG

## 2022-10-12 PROCEDURE — 99238 HOSP IP/OBS DSCHRG MGMT 30/<: CPT

## 2022-10-12 RX ADMIN — OLANZAPINE 5 MILLIGRAM(S): 15 TABLET, FILM COATED ORAL at 04:23

## 2022-10-12 NOTE — BH INPATIENT PSYCHIATRY PROGRESS NOTE - NSBHLEGALSTATUSDATE_PSY_ALL_CORE
29-Sep-2022

## 2022-10-12 NOTE — BH INPATIENT PSYCHIATRY PROGRESS NOTE - NSBHMSEAFFCONG_PSY_A_CORE
Non-congruent
Congruent
Non-congruent
Congruent
Congruent

## 2022-10-12 NOTE — BH INPATIENT PSYCHIATRY PROGRESS NOTE - NSBHMSETHTCONTENT_PSY_A_CORE
Unremarkable
Delusions
Delusions
Unremarkable
Unremarkable

## 2022-10-12 NOTE — BH INPATIENT PSYCHIATRY PROGRESS NOTE - NSTXDISORGGOAL_PSY_ALL_CORE
Will make at least 3 goal and reality oriented statements during therapy
Will demonstrate the ability to maintain reality orientation and communicate clearly with others during 2 conversations with staff daily
Will demonstrate the ability to maintain reality orientation and communicate clearly with others during 2 conversations with staff daily
Will make at least 3 goal and reality oriented statements during therapy
Will demonstrate the ability to maintain reality orientation and communicate clearly with others during 2 conversations with staff daily
Will demonstrate the ability to maintain reality orientation and communicate clearly with others during 2 conversations with staff daily
Will make at least 3 goal and reality oriented statements during therapy
Will demonstrate the ability to maintain reality orientation and communicate clearly with others during 2 conversations with staff daily
Will make at least 3 goal and reality oriented statements during therapy
Will demonstrate the ability to maintain reality orientation and communicate clearly with others during 2 conversations with staff daily
Will make at least 3 goal and reality oriented statements during therapy

## 2022-10-12 NOTE — BH INPATIENT PSYCHIATRY PROGRESS NOTE - NSBHCONSBHPROVDETAILS_PSY_A_CORE  FT
discussed with Dr. Barker as above

## 2022-10-12 NOTE — BH INPATIENT PSYCHIATRY PROGRESS NOTE - NSTXDISORGINTERMD_PSY_ALL_CORE
Abilify 5mg daily  and Lithium 600mgqhs

## 2022-10-12 NOTE — BH INPATIENT PSYCHIATRY PROGRESS NOTE - CURRENT MEDICATION
MEDICATIONS  (STANDING):  lithium CR (ESKALITH-CR) 450 milliGRAM(s) Oral at bedtime  OLANZapine 5 milliGRAM(s) Oral at bedtime    MEDICATIONS  (PRN):  LORazepam     Tablet 1 milliGRAM(s) Oral every 4 hours PRN anxiety/insomnia  melatonin. 3 milliGRAM(s) Oral at bedtime PRN Insomnia  OLANZapine 5 milliGRAM(s) Oral every 4 hours PRN agitation  OLANZapine Injectable 5 milliGRAM(s) IntraMuscular once PRN agitation  QUEtiapine 50 milliGRAM(s) Oral at bedtime PRN insomnia/psychosis  
MEDICATIONS  (STANDING):  lithium CR (ESKALITH-CR) 450 milliGRAM(s) Oral at bedtime  OLANZapine 10 milliGRAM(s) Oral at bedtime    MEDICATIONS  (PRN):  LORazepam     Tablet 1 milliGRAM(s) Oral every 4 hours PRN anxiety/insomnia  melatonin. 3 milliGRAM(s) Oral at bedtime PRN Insomnia  OLANZapine 5 milliGRAM(s) Oral every 4 hours PRN agitation  OLANZapine Injectable 5 milliGRAM(s) IntraMuscular once PRN agitation  QUEtiapine 50 milliGRAM(s) Oral at bedtime PRN insomnia/psychosis  
MEDICATIONS  (STANDING):  lithium CR (ESKALITH-CR) 450 milliGRAM(s) Oral at bedtime  OLANZapine 10 milliGRAM(s) Oral at bedtime    MEDICATIONS  (PRN):  LORazepam     Tablet 1 milliGRAM(s) Oral every 4 hours PRN anxiety/insomnia  melatonin. 3 milliGRAM(s) Oral at bedtime PRN Insomnia  OLANZapine 5 milliGRAM(s) Oral every 4 hours PRN agitation  OLANZapine Injectable 5 milliGRAM(s) IntraMuscular once PRN agitation  QUEtiapine 50 milliGRAM(s) Oral at bedtime PRN insomnia/psychosis  
MEDICATIONS  (STANDING):  lithium CR (ESKALITH-CR) 450 milliGRAM(s) Oral at bedtime  QUEtiapine 50 milliGRAM(s) Oral at bedtime    MEDICATIONS  (PRN):  LORazepam     Tablet 1 milliGRAM(s) Oral every 4 hours PRN anxiety/insomnia  melatonin. 3 milliGRAM(s) Oral at bedtime PRN Insomnia  OLANZapine 5 milliGRAM(s) Oral every 4 hours PRN agitation  OLANZapine Injectable 5 milliGRAM(s) IntraMuscular once PRN agitation  QUEtiapine 50 milliGRAM(s) Oral at bedtime PRN insomnia/psychosis  
MEDICATIONS  (STANDING):  lithium CR (ESKALITH-CR) 450 milliGRAM(s) Oral at bedtime  OLANZapine 10 milliGRAM(s) Oral at bedtime    MEDICATIONS  (PRN):  LORazepam     Tablet 1 milliGRAM(s) Oral every 4 hours PRN anxiety/insomnia  melatonin. 3 milliGRAM(s) Oral at bedtime PRN Insomnia  OLANZapine 5 milliGRAM(s) Oral every 4 hours PRN agitation  OLANZapine Injectable 5 milliGRAM(s) IntraMuscular once PRN agitation  QUEtiapine 50 milliGRAM(s) Oral at bedtime PRN insomnia/psychosis  
MEDICATIONS  (STANDING):  lithium CR (ESKALITH-CR) 450 milliGRAM(s) Oral at bedtime  OLANZapine 10 milliGRAM(s) Oral at bedtime    MEDICATIONS  (PRN):  LORazepam     Tablet 1 milliGRAM(s) Oral every 4 hours PRN anxiety/insomnia  melatonin. 3 milliGRAM(s) Oral at bedtime PRN Insomnia  OLANZapine 5 milliGRAM(s) Oral every 4 hours PRN agitation  OLANZapine Injectable 5 milliGRAM(s) IntraMuscular once PRN agitation  QUEtiapine 50 milliGRAM(s) Oral at bedtime PRN insomnia/psychosis  
MEDICATIONS  (STANDING):  lithium CR (ESKALITH-CR) 450 milliGRAM(s) Oral at bedtime  QUEtiapine 50 milliGRAM(s) Oral at bedtime    MEDICATIONS  (PRN):  LORazepam     Tablet 1 milliGRAM(s) Oral every 4 hours PRN anxiety/insomnia  melatonin. 3 milliGRAM(s) Oral at bedtime PRN Insomnia  OLANZapine 5 milliGRAM(s) Oral every 4 hours PRN agitation  OLANZapine Injectable 5 milliGRAM(s) IntraMuscular once PRN agitation  QUEtiapine 50 milliGRAM(s) Oral at bedtime PRN insomnia/psychosis  
MEDICATIONS  (STANDING):  lithium CR (ESKALITH-CR) 450 milliGRAM(s) Oral at bedtime  OLANZapine 10 milliGRAM(s) Oral at bedtime    MEDICATIONS  (PRN):  LORazepam     Tablet 1 milliGRAM(s) Oral every 4 hours PRN anxiety/insomnia  melatonin. 3 milliGRAM(s) Oral at bedtime PRN Insomnia  OLANZapine 5 milliGRAM(s) Oral every 4 hours PRN agitation  OLANZapine Injectable 5 milliGRAM(s) IntraMuscular once PRN agitation  QUEtiapine 50 milliGRAM(s) Oral at bedtime PRN insomnia/psychosis  
MEDICATIONS  (STANDING):  lithium CR (ESKALITH-CR) 450 milliGRAM(s) Oral at bedtime  QUEtiapine 50 milliGRAM(s) Oral at bedtime    MEDICATIONS  (PRN):  LORazepam     Tablet 1 milliGRAM(s) Oral every 4 hours PRN anxiety/insomnia  melatonin. 3 milliGRAM(s) Oral at bedtime PRN Insomnia  OLANZapine 5 milliGRAM(s) Oral every 4 hours PRN agitation  OLANZapine Injectable 5 milliGRAM(s) IntraMuscular once PRN agitation  QUEtiapine 50 milliGRAM(s) Oral at bedtime PRN insomnia/psychosis  
MEDICATIONS  (STANDING):  lithium CR (ESKALITH-CR) 450 milliGRAM(s) Oral at bedtime  OLANZapine 10 milliGRAM(s) Oral at bedtime    MEDICATIONS  (PRN):  LORazepam     Tablet 1 milliGRAM(s) Oral every 4 hours PRN anxiety/insomnia  melatonin. 3 milliGRAM(s) Oral at bedtime PRN Insomnia  OLANZapine 5 milliGRAM(s) Oral every 4 hours PRN agitation  OLANZapine Injectable 5 milliGRAM(s) IntraMuscular once PRN agitation  QUEtiapine 50 milliGRAM(s) Oral at bedtime PRN insomnia/psychosis  
MEDICATIONS  (STANDING):  lithium CR (ESKALITH-CR) 450 milliGRAM(s) Oral at bedtime  OLANZapine 10 milliGRAM(s) Oral at bedtime    MEDICATIONS  (PRN):  LORazepam     Tablet 1 milliGRAM(s) Oral every 4 hours PRN anxiety/insomnia  melatonin. 3 milliGRAM(s) Oral at bedtime PRN Insomnia  OLANZapine 5 milliGRAM(s) Oral every 4 hours PRN agitation  OLANZapine Injectable 5 milliGRAM(s) IntraMuscular once PRN agitation  QUEtiapine 50 milliGRAM(s) Oral at bedtime PRN insomnia/psychosis

## 2022-10-12 NOTE — BH INPATIENT PSYCHIATRY PROGRESS NOTE - NSBHMSEAFFQUAL_PSY_A_CORE
Other
Euthymic
Euthymic/Depressed
Euthymic
Euthymic/Depressed
Elevated/Other
Euthymic
Elevated
Elevated/Other
Euthymic
Elevated

## 2022-10-12 NOTE — BH INPATIENT PSYCHIATRY PROGRESS NOTE - NSBHASSESSSUMMFT_PSY_ALL_CORE
22 year old woman with history of bipolar disorder, transferred from bipolar clinic to [facility], unclear if dx changed to SAD.  Multiple prior admissions.  Has been noncompliant with Seroquel, increasingly delusions and having VH at home, not sleeping 5 nights in a row.  Directly admitted through [facility] after presenting unannounced for Crisis visit.  Will restart antipsychotic medication and with standing and PRN medication for sleep.  Diagnosis still unclear.  Thought blocking limits interview.      10/1: no behavioral issues. adherent to medications. no complaints. no a/e to lithium. oddly related. c/w plan as below per primary     10/2: sleeping well, reports improving mood. pt remains medication adherent. visible in the milieu. no SI. No AH. c/w plan as below     >Legal: 9.13  >Obs: Routine, no current active SI. no need for CO  >Psychiatric Meds: c/w lithium as Eskalith 450mg qHS; Seroquel 50mg qHS standing, PRN for sleep.  Ativan PRN for anxiety  Zyprexa 5mg PO/IM for agitation  >Labs: Admission labs ordered including CBC, CMP.  >Medical:  No acute concerns.   >Social: milieu/structured therapy  >Treatment Interventions: Groups and Individual Therapy/CBT, Motivational counseling for substance abuse related issues.     
22 year old woman with history of bipolar disorder, transferred from bipolar clinic to [facility], unclear if dx changed to SAD.  Multiple prior admissions.  Has been noncompliant with Seroquel, increasingly delusions and having VH at home, not sleeping 5 nights in a row.  Directly admitted through [facility] after presenting unannounced for Crisis visit.  Will restart antipsychotic medication and with standing and PRN medication for sleep.  Diagnosis still unclear.  Thought blocking limits interview.      Resolving anson.   Switch Seroquel to Zyprexa for bipolar disorder maintenance treatment.      >Legal: 9.13  >Obs: Routine, no current active SI. no need for CO  >Psychiatric Meds: c/w lithium as Eskalith 450mg qHS;  Zyprexa 5mg qHS standing, Seroquel 50mgPRN for sleep.  Ativan PRN for anxiety  Zyprexa 5mg PO/IM for agitation  >Labs: Admission labs ordered including CBC, CMP.  >Medical:  No acute concerns.   >Social: milieu/structured therapy  >Treatment Interventions: Groups and Individual Therapy/CBT, Motivational counseling for substance abuse related issues.     
22 year old woman with history of bipolar disorder, transferred from bipolar clinic to [facility], unclear if dx changed to SAD.  Multiple prior admissions.  Has been noncompliant with Seroquel, increasingly delusions and having VH at home, not sleeping 5 nights in a row.  Directly admitted through [facility] after presenting unannounced for Crisis visit.  Will restart antipsychotic medication and with standing and PRN medication for sleep.  Diagnosis still unclear.  Thought blocking limits interview.      Resolving madyson.   Increase Zyprexa dose for bipolar maintenance in setting of resolving madyson.  CMP for monitoring of electrolytes on Li and LFTs on Zyprexa, CBC monitoring on Zyprexa all WNL 10/5.  Li level 0.5 10/5/22, no clinical signs of toxicity and this is lower than prior dose.  Madyson continues to resolve.    >Legal: 9.13  >Obs: Routine, no current active SI. no need for CO  >Psychiatric Meds: c/w lithium as Eskalith 450mg qHS;  Zyprexa 10mg qHS standing, Seroquel 50mgPRN for sleep.  Ativan PRN for anxiety  Zyprexa 5mg PO/IM for agitation  >Labs: Admission labs ordered including CBC, CMP.  >Medical:  No acute concerns.   >Social: milieu/structured therapy  >Treatment Interventions: Groups and Individual Therapy/CBT, Motivational counseling for substance abuse related issues.     
22 year old woman with history of bipolar disorder, transferred from bipolar clinic to [facility], unclear if dx changed to SAD.  Multiple prior admissions.  Has been noncompliant with Seroquel, increasingly delusions and having VH at home, not sleeping 5 nights in a row.  Directly admitted through [facility] after presenting unannounced for Crisis visit.  Will restart antipsychotic medication and with standing and PRN medication for sleep.  Diagnosis still unclear.  Thought blocking limits interview.  Patient presenting manic today with labile affect, predominantly elated, decreased need for sleep, psychosis.      >Legal: 9.13  >Obs: Routine, no current active SI. no need for CO  >Psychiatric Meds: Restart lithium as Eskalith 450mg qHS; Seroquel 50mg qHS standing, PRN for sleep.  Ativan PRN for anxiety  Zyprexa 5mg PO/IM for agitation  >Labs: Admission labs ordered including CBC, CMP.  >Medical:  No acute concerns.   >Social: milieu/structured therapy  >Treatment Interventions: Groups and Individual Therapy/CBT, Motivational counseling for substance abuse related issues.     
22 year old woman with history of bipolar disorder, transferred from bipolar clinic to [facility], unclear if dx changed to SAD.  Multiple prior admissions.  Has been noncompliant with Seroquel, increasingly delusions and having VH at home, not sleeping 5 nights in a row.  Directly admitted through [facility] after presenting unannounced for Crisis visit.  Will restart antipsychotic medication and with standing and PRN medication for sleep.  Diagnosis still unclear.  Thought blocking limits interview.      Resolving anson.   Increase Zyprexa dose for bipolar maintenance in setting of resolving anson.  CMP tomorrow AM for monitoring of electrolytes on Li and LFTs on Zyprexa, CBC monitoring on Zyprexa.  Li level 0.5, no clinical signs of toxicity and this is lower than prior dose.  CMP WNL.    >Legal: 9.13  >Obs: Routine, no current active SI. no need for CO  >Psychiatric Meds: c/w lithium as Eskalith 450mg qHS;  Zyprexa 10mg qHS standing, Seroquel 50mgPRN for sleep.  Ativan PRN for anxiety  Zyprexa 5mg PO/IM for agitation  >Labs: Admission labs ordered including CBC, CMP.  >Medical:  No acute concerns.   >Social: milieu/structured therapy  >Treatment Interventions: Groups and Individual Therapy/CBT, Motivational counseling for substance abuse related issues.     
22 year old woman with history of bipolar disorder, transferred from bipolar clinic to [facility], unclear if dx changed to SAD.  Multiple prior admissions.  Has been noncompliant with Seroquel, increasingly delusions and having VH at home, not sleeping 5 nights in a row.  Directly admitted through [facility] after presenting unannounced for Crisis visit.  Will restart antipsychotic medication and with standing and PRN medication for sleep.  Diagnosis still unclear.  Thought blocking limits interview.      Resolving madyson.   Increase Zyprexa dose for bipolar maintenance in setting of resolving madyson.  CMP for monitoring of electrolytes on Li and LFTs on Zyprexa, CBC monitoring on Zyprexa all WNL 10/5.  Li level 0.5 10/5/22, 0.4 10/11, no clinical signs of toxicity and this is lower than prior dose.  Madyson continues to resolve.  Discharge planned for Wednesday.    >Legal: 9.13  >Obs: Routine, no current active SI. no need for CO  >Psychiatric Meds: c/w lithium as Eskalith 450mg qHS;  Zyprexa 10mg qHS standing, Seroquel 50mgPRN for sleep.  Ativan PRN for anxiety  Zyprexa 5mg PO/IM for agitation  >Medical:  No acute concerns.   >Social: milieu/structured therapy  
22 year old woman with history of bipolar disorder, transferred from bipolar clinic to [facility], unclear if dx changed to SAD.  Multiple prior admissions.  Has been noncompliant with Seroquel, increasingly delusions and having VH at home, not sleeping 5 nights in a row.  Directly admitted through [facility] after presenting unannounced for Crisis visit.  Will restart antipsychotic medication and with standing and PRN medication for sleep.  Diagnosis still unclear.  Thought blocking limits interview.      Resolved madyson.   CMP for monitoring of electrolytes on Li and LFTs on Zyprexa, CBC monitoring on Zyprexa all WNL 10/5.  Li level 0.5 10/5/22, 0.4 10/11, no clinical signs of toxicity and this is lower than prior dose.  Madyson continues to resolve.  Discharge today, see risk assessment in discharge summary.    c/w lithium as Eskalith 450mg qHS;  Zyprexa 10mg qHS standing, Seroquel 50mgPRN for sleep.  Ativan PRN for anxiety  Zyprexa 5mg PO/IM for insomnia at home PRN  f/u in bipolar center  
22 year old woman with history of bipolar disorder, transferred from bipolar clinic to [facility], unclear if dx changed to SAD.  Multiple prior admissions.  Has been noncompliant with Seroquel, increasingly delusions and having VH at home, not sleeping 5 nights in a row.  Directly admitted through [facility] after presenting unannounced for Crisis visit.  Will restart antipsychotic medication and with standing and PRN medication for sleep.  Diagnosis still unclear.  Thought blocking limits interview.      10/1: no behavioral issues. adherent to medications. no complaints. no a/e to lithium. oddly related. c/w plan as below per primary     >Legal: 9.13  >Obs: Routine, no current active SI. no need for CO  >Psychiatric Meds: Restart lithium as Eskalith 450mg qHS; Seroquel 50mg qHS standing, PRN for sleep.  Ativan PRN for anxiety  Zyprexa 5mg PO/IM for agitation  >Labs: Admission labs ordered including CBC, CMP.  >Medical:  No acute concerns.   >Social: milieu/structured therapy  >Treatment Interventions: Groups and Individual Therapy/CBT, Motivational counseling for substance abuse related issues.     
22 year old woman with history of bipolar disorder, transferred from bipolar clinic to [facility], unclear if dx changed to SAD.  Multiple prior admissions.  Has been noncompliant with Seroquel, increasingly delusions and having VH at home, not sleeping 5 nights in a row.  Directly admitted through [facility] after presenting unannounced for Crisis visit.  Will restart antipsychotic medication and with standing and PRN medication for sleep.  Diagnosis still unclear.  Thought blocking limits interview.      Resolving anson.   Increase Zyprexa dose for bipolar maintenance in setting of resolving anson.  CMP tomorrow AM for monitoring of electrolytes on Li and LFTs on Zyprexa, CBC monitoring on Zyprexa.  Li level tomorrow, no clinical signs of toxicity and this is lower than prior dose.      >Legal: 9.13  >Obs: Routine, no current active SI. no need for CO  >Psychiatric Meds: c/w lithium as Eskalith 450mg qHS;  Zyprexa 5mg qHS standing, Seroquel 50mgPRN for sleep.  Ativan PRN for anxiety  Zyprexa 5mg PO/IM for agitation  >Labs: Admission labs ordered including CBC, CMP.  >Medical:  No acute concerns.   >Social: milieu/structured therapy  >Treatment Interventions: Groups and Individual Therapy/CBT, Motivational counseling for substance abuse related issues.     
22 year old woman with history of bipolar disorder, transferred from bipolar clinic to [facility], unclear if dx changed to SAD.  Multiple prior admissions.  Has been noncompliant with Seroquel, increasingly delusions and having VH at home, not sleeping 5 nights in a row.  Directly admitted through [facility] after presenting unannounced for Crisis visit.  Will restart antipsychotic medication and with standing and PRN medication for sleep.  Diagnosis still unclear.  Thought blocking limits interview.      Resolving anson.   Increase Zyprexa dose for bipolar maintenance in setting of resolving anson.  CMP tomorrow AM for monitoring of electrolytes on Li and LFTs on Zyprexa, CBC monitoring on Zyprexa.  Li level 0.5 10/5/22, no clinical signs of toxicity and this is lower than prior dose.  CMP WNL.    >Legal: 9.13  >Obs: Routine, no current active SI. no need for CO  >Psychiatric Meds: c/w lithium as Eskalith 450mg qHS;  Zyprexa 10mg qHS standing, Seroquel 50mgPRN for sleep.  Ativan PRN for anxiety  Zyprexa 5mg PO/IM for agitation  >Labs: Admission labs ordered including CBC, CMP.  >Medical:  No acute concerns.   >Social: milieu/structured therapy  >Treatment Interventions: Groups and Individual Therapy/CBT, Motivational counseling for substance abuse related issues.     
22 year old woman with history of bipolar disorder, transferred from bipolar clinic to [facility], unclear if dx changed to SAD.  Multiple prior admissions.  Has been noncompliant with Seroquel, increasingly delusions and having VH at home, not sleeping 5 nights in a row.  Directly admitted through [facility] after presenting unannounced for Crisis visit.  Will restart antipsychotic medication and with standing and PRN medication for sleep.  Diagnosis still unclear.  Thought blocking limits interview.      Resolving madyson.   Increase Zyprexa dose for bipolar maintenance in setting of resolving madyson.  CMP for monitoring of electrolytes on Li and LFTs on Zyprexa, CBC monitoring on Zyprexa all WNL 10/5.  Li level 0.5 10/5/22, no clinical signs of toxicity and this is lower than prior dose.  Madyson continues to resolve.  Discharge planned for Wednesday.    >Legal: 9.13  >Obs: Routine, no current active SI. no need for CO  >Psychiatric Meds: c/w lithium as Eskalith 450mg qHS;  Zyprexa 10mg qHS standing, Seroquel 50mgPRN for sleep.  Ativan PRN for anxiety  Zyprexa 5mg PO/IM for agitation  >Labs: Admission labs ordered including CBC, CMP.  >Medical:  No acute concerns.   >Social: milieu/structured therapy  >Treatment Interventions: Groups and Individual Therapy/CBT, Motivational counseling for substance abuse related issues.

## 2022-10-12 NOTE — BH INPATIENT PSYCHIATRY PROGRESS NOTE - NSTXMANICDATEEST_PSY_ALL_CORE
02-Apr-2021
05-Oct-2022
02-Apr-2021
05-Oct-2022
02-Apr-2021
02-Apr-2021
05-Oct-2022
02-Apr-2021

## 2022-10-12 NOTE — BH INPATIENT PSYCHIATRY PROGRESS NOTE - NSBHMSESPEECH_PSY_A_CORE
Abnormal as indicated, otherwise normal...
Abnormal as indicated, otherwise normal...
Normal volume, rate, productivity, spontaneity and articulation
Abnormal as indicated, otherwise normal...

## 2022-10-12 NOTE — BH INPATIENT PSYCHIATRY DISCHARGE NOTE - DESCRIPTION
Single, only child. biological father living in [place]. Pt born in [place] and reports she migrated to [place] at age 9.  Mother remarried and current, Pt lives with mother and step father.  Step father is reportedly a Professor in Biology teaching at [employer]; mother is a soft ware .  Pt is currently a sophomore at [employer] taking Computer science.  She reports being interested in Languages.  Likes dancing, drawing, composing music.  she is not Religion

## 2022-10-12 NOTE — BH INPATIENT PSYCHIATRY PROGRESS NOTE - NSTXPSYCHODATEEST_PSY_ALL_CORE
30-Sep-2022
05-Oct-2022

## 2022-10-12 NOTE — BH INPATIENT PSYCHIATRY PROGRESS NOTE - NSTXPSYCHOGOAL_PSY_ALL_CORE
Will identify 2 coping skills that assist with focus on reality
Will identify 2 coping skills that assist with focus on reality
Will identify 1 trigger/stressor that exacerbates thoughts
Will identify 2 coping skills that assist with focus on reality

## 2022-10-12 NOTE — BH INPATIENT PSYCHIATRY PROGRESS NOTE - NSTXDCOPNODATEEST_PSY_ALL_CORE
30-Sep-2022
07-Oct-2022
30-Sep-2022
30-Sep-2022
07-Oct-2022
30-Sep-2022
07-Oct-2022
30-Sep-2022
07-Oct-2022

## 2022-10-12 NOTE — BH INPATIENT PSYCHIATRY PROGRESS NOTE - NSTXDCOPNODATETRGT_PSY_ALL_CORE
14-Oct-2022
07-Oct-2022
14-Oct-2022
14-Oct-2022
07-Oct-2022
14-Oct-2022

## 2022-10-12 NOTE — BH INPATIENT PSYCHIATRY DISCHARGE NOTE - NSBHMETABOLIC_PSY_ALL_CORE_FT
BMI:   HbA1c: A1C with Estimated Average Glucose Result: 5.3 % (09-30-22 @ 08:21)    Glucose:   BP: 116/75 (10-12-22 @ 07:00) (110/74 - 116/75)  Lipid Panel: Date/Time: 09-29-22 @ 18:35  Cholesterol, Serum: 125  Direct LDL: --  HDL Cholesterol, Serum: 58  Total Cholesterol/HDL Ration Measurement: --  Triglycerides, Serum: 52

## 2022-10-12 NOTE — BH INPATIENT PSYCHIATRY PROGRESS NOTE - NSBHCHARTREVIEWVS_PSY_A_CORE FT
Vital Signs Last 24 Hrs  T(C): 36.1 (10-10-22 @ 17:45), Max: 36.1 (10-10-22 @ 17:45)  T(F): 96.9 (10-10-22 @ 17:45), Max: 96.9 (10-10-22 @ 17:45)  HR: 80 (10-11-22 @ 06:16) (80 - 80)  BP: 112/83 (10-11-22 @ 06:16) (112/83 - 112/83)  BP(mean): --  RR: --  SpO2: --    Orthostatic VS  10-10-22 @ 05:43  Lying BP: --/-- HR: --  Sitting BP: 103/76 HR: 81  Standing BP: --/-- HR: --  Site: --  Mode: --  
Vital Signs Last 24 Hrs  T(C): 36.1 (10-06-22 @ 17:46), Max: 36.1 (10-06-22 @ 06:32)  T(F): 97 (10-06-22 @ 17:46), Max: 97 (10-06-22 @ 06:32)  HR: 83 (10-06-22 @ 06:32) (83 - 83)  BP: 102/59 (10-06-22 @ 06:32) (102/59 - 102/59)  BP(mean): --  RR: --  SpO2: --    
Vital Signs Last 24 Hrs  T(C): 37.1 (09-30-22 @ 07:40), Max: 37.1 (09-30-22 @ 07:40)  T(F): 98.7 (09-30-22 @ 07:40), Max: 98.7 (09-30-22 @ 07:40)  HR: 100 (09-30-22 @ 07:40) (88 - 100)  BP: 118/88 (09-30-22 @ 07:40) (118/84 - 118/88)  BP(mean): --  RR: --  SpO2: --    Orthostatic VS  09-29-22 @ 16:19  Lying BP: --/-- HR: --  Sitting BP: 112/86 HR: 91  Standing BP: 113/82 HR: 83  Site: --  Mode: --  
Vital Signs Last 24 Hrs  T(C): 36.3 (10-04-22 @ 07:00), Max: 37.1 (10-03-22 @ 17:22)  T(F): 97.4 (10-04-22 @ 07:00), Max: 98.8 (10-03-22 @ 17:22)  HR: 95 (10-04-22 @ 07:00) (95 - 95)  BP: 123/86 (10-04-22 @ 07:00) (123/86 - 123/86)  BP(mean): --  RR: --  SpO2: --    Orthostatic VS  10-03-22 @ 07:15  Lying BP: --/-- HR: --  Sitting BP: 116/81 HR: 98  Standing BP: --/-- HR: --  Site: --  Mode: --  
Vital Signs Last 24 Hrs  T(C): 36.3 (10-12-22 @ 07:00), Max: 36.3 (10-12-22 @ 07:00)  T(F): 97.4 (10-12-22 @ 07:00), Max: 97.4 (10-12-22 @ 07:00)  HR: 80 (10-12-22 @ 07:00) (80 - 80)  BP: 116/75 (10-12-22 @ 07:00) (116/75 - 116/75)  BP(mean): --  RR: --  SpO2: --    
Vital Signs Last 24 Hrs  T(C): 36.4 (10-01-22 @ 10:27), Max: 36.9 (09-30-22 @ 17:49)  T(F): 97.5 (10-01-22 @ 10:27), Max: 98.4 (09-30-22 @ 17:49)  HR: 102 (10-01-22 @ 10:27) (102 - 102)  BP: 121/84 (10-01-22 @ 10:27) (121/84 - 121/84)  BP(mean): --  RR: --  SpO2: --    Orthostatic VS  09-29-22 @ 16:19  Lying BP: --/-- HR: --  Sitting BP: 112/86 HR: 91  Standing BP: 113/82 HR: 83  Site: --  Mode: --  
Vital Signs Last 24 Hrs  T(C): 36.6 (10-07-22 @ 08:27), Max: 36.6 (10-07-22 @ 08:27)  T(F): 97.9 (10-07-22 @ 08:27), Max: 97.9 (10-07-22 @ 08:27)  HR: --  BP: --  BP(mean): --  RR: --  SpO2: --    Orthostatic VS  10-07-22 @ 08:27  Lying BP: 108/82 HR: 79  Sitting BP: --/-- HR: --  Standing BP: --/-- HR: --  Site: --  Mode: --  
Vital Signs Last 24 Hrs  T(C): 36.6 (10-02-22 @ 07:51), Max: 37.8 (10-01-22 @ 17:26)  T(F): 97.9 (10-02-22 @ 07:51), Max: 100 (10-01-22 @ 17:26)  HR: 102 (10-01-22 @ 10:27) (102 - 102)  BP: 121/84 (10-01-22 @ 10:27) (121/84 - 121/84)  BP(mean): --  RR: --  SpO2: --    Orthostatic VS  10-02-22 @ 07:51  Lying BP: --/-- HR: --  Sitting BP: 110/71 HR: 96  Standing BP: 125/80 HR: 100  Site: --  Mode: --  
Vital Signs Last 24 Hrs  T(C): 36.4 (10-10-22 @ 05:43), Max: 36.6 (10-09-22 @ 17:38)  T(F): 97.5 (10-10-22 @ 05:43), Max: 97.8 (10-09-22 @ 17:38)  HR: 87 (10-09-22 @ 20:19) (87 - 87)  BP: 110/74 (10-09-22 @ 20:19) (110/74 - 110/74)  BP(mean): --  RR: --  SpO2: --    Orthostatic VS  10-10-22 @ 05:43  Lying BP: --/-- HR: --  Sitting BP: 103/76 HR: 81  Standing BP: --/-- HR: --  Site: --  Mode: --  Orthostatic VS  10-09-22 @ 06:30  Lying BP: --/-- HR: --  Sitting BP: 112/78 HR: 84  Standing BP: --/-- HR: --  Site: --  Mode: --  
Vital Signs Last 24 Hrs  T(C): 36.6 (10-05-22 @ 07:13), Max: 36.7 (10-04-22 @ 17:46)  T(F): 97.9 (10-05-22 @ 07:13), Max: 98 (10-04-22 @ 17:46)  HR: 79 (10-05-22 @ 07:13) (79 - 79)  BP: 110/78 (10-05-22 @ 07:13) (110/78 - 110/78)  BP(mean): --  RR: --  SpO2: --    
Vital Signs Last 24 Hrs  T(C): 37.1 (10-03-22 @ 07:15), Max: 37.1 (10-03-22 @ 07:15)  T(F): 98.8 (10-03-22 @ 07:15), Max: 98.8 (10-03-22 @ 07:15)  HR: --  BP: --  BP(mean): --  RR: --  SpO2: --    Orthostatic VS  10-03-22 @ 07:15  Lying BP: --/-- HR: --  Sitting BP: 116/81 HR: 98  Standing BP: --/-- HR: --  Site: --  Mode: --  Orthostatic VS  10-02-22 @ 07:51  Lying BP: --/-- HR: --  Sitting BP: 110/71 HR: 96  Standing BP: 125/80 HR: 100  Site: --  Mode: --

## 2022-10-12 NOTE — BH INPATIENT PSYCHIATRY PROGRESS NOTE - NSTXPSYCHOPROGRES_PSY_ALL_CORE
Met - goal discontinued
No Change
No Change
Improving
Improving
No Change
Improving
No Change
Met - goal discontinued
No Change
Improving

## 2022-10-12 NOTE — BH INPATIENT PSYCHIATRY PROGRESS NOTE - NSBHMSETHTPROC_PSY_A_CORE
Circumstantial/Impaired reasoning
Circumstantial
Circumstantial/Impaired reasoning
Circumstantial/Impaired reasoning
Circumstantial

## 2022-10-12 NOTE — BH INPATIENT PSYCHIATRY PROGRESS NOTE - NSTXMANICDATETRGT_PSY_ALL_CORE
12-Oct-2022
12-Oct-2022
09-Apr-2021
12-Oct-2022
09-Apr-2021
09-Apr-2021
12-Oct-2022
09-Apr-2021
12-Oct-2022
12-Oct-2022
09-Apr-2021

## 2022-10-12 NOTE — BH INPATIENT PSYCHIATRY PROGRESS NOTE - NSTXPROBMANIC_PSY_ALL_CORE
MANIC SYMPTOMS

## 2022-10-12 NOTE — BH INPATIENT PSYCHIATRY PROGRESS NOTE - NSDCCRITERIA_PSY_ALL_CORE
symptom stabilization with improved level of functioning  CGI<=2

## 2022-10-12 NOTE — BH INPATIENT PSYCHIATRY DISCHARGE NOTE - ATTENDING DISCHARGE PHYSICAL EXAMINATION:
slept well last night. She denies feeling anxious. Wants to work on social anxiety in therapy.  Denies SI. Denies AH. No STAT medications required over this interval and pt remains in good behavioral control.  Patient reports feeling stable on this regimen.  We review IPS model of wellness and recovery with bipolar disorder through teachback method including importance of good sleep hygiene, balancing interpersonal and social rhythms to avoid mood episodes, mourning the lost healthy self, and continuing with med compliance.  Lithium safety and toxicity signs reviewed.    Level of Consciousness	Alert  General Appearance	No deformities present  Body Habitus	Average build  Hygiene	Fair  Grooming	Fair  Behavior	Cooperative  Eye Contact	Fair  Relatedness	Fair  Impulse Control	Normal  Muscle Tone/Strength	Normal muscle tone/strength  Abnormal Movements	No abnormal movements  Gait/Station	Normal gait / station  Speech	Normal volume, rate, productivity, spontaneity and articulation  Mood	Normal  Affect Quality	Euthymic  Affect Range	Full  Affect Congruence	Congruent  Thought Process	Circumstantial  Thought Associations	Normal  Thought Content	Unremarkable  Perceptions	No abnormalities  Orientation	Oriented to time, place, person, situation  Attention/Concentration	Normal  Estimated Intelligence	Average  Recent Memory	Normal  Remote Memory	Normal  Fund of Knowledge	Normal  How Fund of Knowledge Assessed	Vocabulary  Language	No abnormalities noted  Judgment	Good  Insight	Good

## 2022-10-12 NOTE — BH INPATIENT PSYCHIATRY PROGRESS NOTE - NSBHCHARTREVIEWLAB_PSY_A_CORE FT
10-05    140  |  108<H>  |  12  ----------------------------<  125<H>  3.9   |  23  |  0.70    Ca    9.8      05 Oct 2022 08:25    TPro  7.4  /  Alb  4.6  /  TBili  0.3  /  DBili  x   /  AST  19  /  ALT  12  /  AlkPhos  54  10-05                          13.3   7.62  )-----------( 338      ( 05 Oct 2022 08:25 )             40.6     Lithium Level, Serum: 0.5 mmol/L (10.05.22 @ 08:25) 
Lithium Level, Serum: 0.4 mmol/L (10.11.22 @ 08:00)     10-11    140  |  106  |  10  ----------------------------<  79  4.0   |  25  |  0.83    Ca    9.7      11 Oct 2022 08:00        10-05    140  |  108<H>  |  12  ----------------------------<  125<H>  3.9   |  23  |  0.70    Ca    9.8      05 Oct 2022 08:25    TPro  7.4  /  Alb  4.6  /  TBili  0.3  /  DBili  x   /  AST  19  /  ALT  12  /  AlkPhos  54  10-05                          13.3   7.62  )-----------( 338      ( 05 Oct 2022 08:25 )             40.6     Lithium Level, Serum: 0.5 mmol/L (10.05.22 @ 08:25) 
10-05    140  |  108<H>  |  12  ----------------------------<  125<H>  3.9   |  23  |  0.70    Ca    9.8      05 Oct 2022 08:25    TPro  7.4  /  Alb  4.6  /  TBili  0.3  /  DBili  x   /  AST  19  /  ALT  12  /  AlkPhos  54  10-05                          13.3   7.62  )-----------( 338      ( 05 Oct 2022 08:25 )             40.6     Lithium Level, Serum: 0.5 mmol/L (10.05.22 @ 08:25) 
                      14.2   10.21 )-----------( 380      ( 29 Sep 2022 18:35 )             42.8     09-29    136  |  103  |  10  ----------------------------<  122<H>  3.7   |  25  |  0.74    Ca    9.6      29 Sep 2022 18:35    TPro  7.4  /  Alb  4.6  /  TBili  0.4  /  DBili  x   /  AST  13  /  ALT  9   /  AlkPhos  63  09-29  
10-05    140  |  108<H>  |  12  ----------------------------<  125<H>  3.9   |  23  |  0.70    Ca    9.8      05 Oct 2022 08:25    TPro  7.4  /  Alb  4.6  /  TBili  0.3  /  DBili  x   /  AST  19  /  ALT  12  /  AlkPhos  54  10-05                          13.3   7.62  )-----------( 338      ( 05 Oct 2022 08:25 )             40.6     Lithium Level, Serum: 0.5 mmol/L (10.05.22 @ 08:25) 
Lithium Level, Serum: 0.4 mmol/L (10.11.22 @ 08:00)     10-11    140  |  106  |  10  ----------------------------<  79  4.0   |  25  |  0.83    Ca    9.7      11 Oct 2022 08:00        10-05    140  |  108<H>  |  12  ----------------------------<  125<H>  3.9   |  23  |  0.70    Ca    9.8      05 Oct 2022 08:25    TPro  7.4  /  Alb  4.6  /  TBili  0.3  /  DBili  x   /  AST  19  /  ALT  12  /  AlkPhos  54  10-05                          13.3   7.62  )-----------( 338      ( 05 Oct 2022 08:25 )             40.6     Lithium Level, Serum: 0.5 mmol/L (10.05.22 @ 08:25) 
10-05    140  |  108<H>  |  12  ----------------------------<  125<H>  3.9   |  23  |  0.70    Ca    9.8      05 Oct 2022 08:25    TPro  7.4  /  Alb  4.6  /  TBili  0.3  /  DBili  x   /  AST  19  /  ALT  12  /  AlkPhos  54  10-05                          13.3   7.62  )-----------( 338      ( 05 Oct 2022 08:25 )             40.6     Lithium Level, Serum: 0.5 mmol/L (10.05.22 @ 08:25)

## 2022-10-12 NOTE — BH INPATIENT PSYCHIATRY PROGRESS NOTE - PRN MEDS
MEDICATIONS  (PRN):  LORazepam     Tablet 1 milliGRAM(s) Oral every 4 hours PRN anxiety/insomnia  melatonin. 3 milliGRAM(s) Oral at bedtime PRN Insomnia  OLANZapine 5 milliGRAM(s) Oral every 4 hours PRN agitation  OLANZapine Injectable 5 milliGRAM(s) IntraMuscular once PRN agitation  QUEtiapine 50 milliGRAM(s) Oral at bedtime PRN insomnia/psychosis  

## 2022-10-12 NOTE — BH INPATIENT PSYCHIATRY PROGRESS NOTE - NSICDXBHPRIMARYDX_PSY_ALL_CORE
Moderate bipolar I disorder with anson as current episode   F31.12  

## 2022-10-12 NOTE — BH INPATIENT PSYCHIATRY PROGRESS NOTE - NSBHMETABOLIC_PSY_ALL_CORE_FT
BMI:   HbA1c: A1C with Estimated Average Glucose Result: 5.3 % (09-30-22 @ 08:21)    Glucose:   BP: 121/84 (10-01-22 @ 10:27) (118/84 - 121/84)  Lipid Panel: Date/Time: 09-29-22 @ 18:35  Cholesterol, Serum: 125  Direct LDL: --  HDL Cholesterol, Serum: 58  Total Cholesterol/HDL Ration Measurement: --  Triglycerides, Serum: 52  
BMI:   HbA1c: A1C with Estimated Average Glucose Result: 5.3 % (09-30-22 @ 08:21)    Glucose:   BP: 121/84 (10-01-22 @ 10:27) (118/84 - 121/84)  Lipid Panel: Date/Time: 09-29-22 @ 18:35  Cholesterol, Serum: 125  Direct LDL: --  HDL Cholesterol, Serum: 58  Total Cholesterol/HDL Ration Measurement: --  Triglycerides, Serum: 52  
BMI:   HbA1c: A1C with Estimated Average Glucose Result: 5.3 % (09-30-22 @ 08:21)    Glucose:   BP: 121/84 (10-01-22 @ 10:27) (121/84 - 121/84)  Lipid Panel: Date/Time: 09-29-22 @ 18:35  Cholesterol, Serum: 125  Direct LDL: --  HDL Cholesterol, Serum: 58  Total Cholesterol/HDL Ration Measurement: --  Triglycerides, Serum: 52  
BMI:   HbA1c: A1C with Estimated Average Glucose Result: 5.3 % (09-30-22 @ 08:21)    Glucose:   BP: 116/75 (10-12-22 @ 07:00) (112/83 - 116/75)  Lipid Panel: Date/Time: 09-29-22 @ 18:35  Cholesterol, Serum: 125  Direct LDL: --  HDL Cholesterol, Serum: 58  Total Cholesterol/HDL Ration Measurement: --  Triglycerides, Serum: 52  
BMI:   HbA1c: A1C with Estimated Average Glucose Result: 5.3 % (09-30-22 @ 08:21)    Glucose:   BP: 112/83 (10-11-22 @ 06:16) (110/74 - 112/83)  Lipid Panel: Date/Time: 09-29-22 @ 18:35  Cholesterol, Serum: 125  Direct LDL: --  HDL Cholesterol, Serum: 58  Total Cholesterol/HDL Ration Measurement: --  Triglycerides, Serum: 52  
BMI:   HbA1c: A1C with Estimated Average Glucose Result: 5.3 % (09-30-22 @ 08:21)    Glucose:   BP: 102/59 (10-06-22 @ 06:32) (102/59 - 110/78)  Lipid Panel: Date/Time: 09-29-22 @ 18:35  Cholesterol, Serum: 125  Direct LDL: --  HDL Cholesterol, Serum: 58  Total Cholesterol/HDL Ration Measurement: --  Triglycerides, Serum: 52  
BMI:   HbA1c: A1C with Estimated Average Glucose Result: 5.3 % (09-30-22 @ 08:21)    Glucose:   BP: 110/78 (10-05-22 @ 07:13) (110/78 - 123/86)  Lipid Panel: Date/Time: 09-29-22 @ 18:35  Cholesterol, Serum: 125  Direct LDL: --  HDL Cholesterol, Serum: 58  Total Cholesterol/HDL Ration Measurement: --  Triglycerides, Serum: 52  
BMI:   HbA1c: A1C with Estimated Average Glucose Result: 5.3 % (09-30-22 @ 08:21)    Glucose:   BP: 110/74 (10-09-22 @ 20:19) (110/74 - 110/74)  Lipid Panel: Date/Time: 09-29-22 @ 18:35  Cholesterol, Serum: 125  Direct LDL: --  HDL Cholesterol, Serum: 58  Total Cholesterol/HDL Ration Measurement: --  Triglycerides, Serum: 52  
BMI:   HbA1c: A1C with Estimated Average Glucose Result: 5.3 % (09-30-22 @ 08:21)    Glucose:   BP: 123/86 (10-04-22 @ 07:00) (123/86 - 123/86)  Lipid Panel: Date/Time: 09-29-22 @ 18:35  Cholesterol, Serum: 125  Direct LDL: --  HDL Cholesterol, Serum: 58  Total Cholesterol/HDL Ration Measurement: --  Triglycerides, Serum: 52  
BMI:   HbA1c: A1C with Estimated Average Glucose Result: 5.3 % (09-30-22 @ 08:21)    Glucose:   BP: 118/88 (09-30-22 @ 07:40) (118/84 - 118/88)  Lipid Panel: Date/Time: 09-29-22 @ 18:35  Cholesterol, Serum: 125  Direct LDL: --  HDL Cholesterol, Serum: 58  Total Cholesterol/HDL Ration Measurement: --  Triglycerides, Serum: 52  
BMI:   HbA1c: A1C with Estimated Average Glucose Result: 5.3 % (09-30-22 @ 08:21)    Glucose:   BP: 102/59 (10-06-22 @ 06:32) (102/59 - 123/86)  Lipid Panel: Date/Time: 09-29-22 @ 18:35  Cholesterol, Serum: 125  Direct LDL: --  HDL Cholesterol, Serum: 58  Total Cholesterol/HDL Ration Measurement: --  Triglycerides, Serum: 52

## 2022-10-12 NOTE — BH INPATIENT PSYCHIATRY DISCHARGE NOTE - NSICDXPASTMEDICALHX_GEN_ALL_CORE_FT
All labs discussed with patient.   Patient to see IMD
PAST MEDICAL HISTORY:  Bipolar disorder     No pertinent past medical history

## 2022-10-12 NOTE — BH INPATIENT PSYCHIATRY PROGRESS NOTE - NSTXDISORGDATETRGT_PSY_ALL_CORE
12-Oct-2022
09-Apr-2021
09-Apr-2021
12-Oct-2022
09-Apr-2021
12-Oct-2022
09-Apr-2021
09-Apr-2021
12-Oct-2022

## 2022-10-12 NOTE — BH INPATIENT PSYCHIATRY DISCHARGE NOTE - HOSPITAL COURSE
Patient was admitted following 5 nights of no sleep with ensuing anson and psychosis.  She had delusions about being a special vessel for the Holy [employer].  Reportedly she was shouting in the car, something she later reflected on regretfully.  She presented extremely thought blocked.  She improved with resumption of Seroquel and had residual elevated mood after day 1 of admission.  She was switched from Seroquel to olanzapine as she reported dizziness at night.  THis was titrated to 10mg with good effect and tolerability.  She was also restarted on lithium at a low dose as she had severe tremor on dose giving 0.9 level.  She was maintained at 450mg qHS with level of 0.5-0.4 with good effect and tolerability.  She continued to be somewhat childlike throughout admission .  She gained much insight and was more committed to taking medication by end of hospitalization.    Suicide and risk assessment performed prior to discharge. The patient has a low acute risk and low chronic risk of self-harm and aggression towards others. Protective factors include denying SI, no SIB, denying HI, good social supports in their family, no substance abuse, no current mood symptoms, no hopelessness, future-oriented in returning to home, no access to firearms.  Risk factors include presenting illness. Immediate risk was minimized by inpatient admission to a safe environment with appropriate supervision and limited access to lethal means. Future risk was minimized before discharge by treatment of acute episode, maximizing outpatient support, providing relevant patient education, discussing emergency procedures, and ensuring close follow-up, treatment with lithium. The patient remains at a low risk of self-harm, and such risk cannot be further ameliorated by continued inpatient treatment and the patient is therefore appropriate for discharge.       There were no behavioral problems on the unit.  Patient did not become agitated and did not require emergent intramuscular medications or seclusion / restraints.  Patient did not self-harm on the unit.  Patient remained actively engaged in treatment.  Patient participated in individual, group, and milieu therapy.  Patient got along appropriately with staff and peers.   Patient did not have any medical problems during this hospitalization.  There were no medical consultations.    A full discussion of the factors that predict treatment success and relapse was held including safety planning.  A discussion of the risks and benefits of patient’s medication was held including a discussion of the metabolic risks and risk of EPS and TD was done. Extensive education about Lithium including early and late signs of toxicity, need to get medical attention or go to ED if they experience these s/s, need to maintain good hydration, contraindication of NSAIDs, and need to consult with psychiatrist know before taking any additional prescribed meds.        The patient has improved significantly and no longer requires inpatient treatment and care. Patient denies all suicidal and aggressive ideation, intent and plan. Patient denies anxiety symptoms and panic attacks. Patient is not judged to be an acute danger to self or others at this time. Patient will be discharged today to home and outpatient follow up.

## 2022-10-12 NOTE — BH INPATIENT PSYCHIATRY PROGRESS NOTE - NSBHFUPINTERVALCCFT_PSY_A_CORE
bipolar disorder
follow up mood 
bipolar disorder
follow up mood 
follow up mood 
bipolar disorder
anson
bipolar disorder
bipolar disorder

## 2022-10-12 NOTE — BH INPATIENT PSYCHIATRY PROGRESS NOTE - NSBHFUPINTERVALHXFT_PSY_A_CORE
Chart reviewed including pertinent labs. Case discussed with nursing staff. Pleasant on approach, reports feeling "good" this morning; slept well last night. She denies feeling anxious. Denies SI. Denies AH. No STAT medications required over this interval and pt remains in good behavioral control.  Patient reports feeling stable on this regimen.  Wishes to show team list she made of things that make her feel at peace vs things that do not which include feeling heard, being able to be creative at night.  We note that patient awoke at 3am and was then coloring in the day room.  We discussed that she requires more sleep especially with recovering from manic episode and should attempt to go back to sleep until at least 5:30am and can then be creative in early AM.  
chart reviewed including pertinent labs. Case discussed with nursing staff. pt pleasant on approach, reports feeling "good" this morning; slept better last night. She believes it was due to her taking medications and having made her bed. She denies feeling anxious. Denies SI. Denies AH. no a/e to medications. no STAT medications required over this interval and pt remains in good behavioral control 
chart reviewed including pertinent labs. Case discussed with nursing staff. pt has no complaints. attended groups this AM. Responses to questions with some delay, otherwise has no complaints. reports sleeping well. no a/e to Lithium. she is adherent to ordered medications. utilizing PRNs appropriately. no behavioral issues. no overnight events 
Chart reviewed including pertinent labs. Case discussed with nursing staff. Pleasant on approach, reports feeling "good" this morning; slept well last night but continues to awaken early. She denies feeling anxious. Denies SI. Denies AH. No STAT medications required over this interval and pt remains in good behavioral control.  Patient reports feeling stable on this regimen.  Shows her artwork.  We discuss her reading "An Unquiet Mind" and her increasing acceptance of diagnosis and motivation to adhere to medications.  Discuss potential for lithium monotherapy in the future (at higher dose)
Chart reviewed including pertinent labs. Case discussed with nursing staff. Pleasant on approach, reports feeling "good" this morning; slept well last night. She denies feeling anxious. Denies SI. Denies AH. No STAT medications required over this interval and pt remains in good behavioral control.  Patient reports feeling stable on this regimen.  Discloses that she has an imaginary friend/boyfriend which she has previously told staff about.  Denies ever seeing or hearing him and consistently knows he is in her imagination even while manic and psychotic.  We discuss the importance of explaining her imaginary friend to future clinicians treating her.  
Chart reviewed including pertinent labs. Case discussed with nursing staff. Pleasant on approach, reports feeling "good" this morning; slept well last night but continues to awaken early. She denies feeling anxious. Denies SI. Denies AH. No STAT medications required over this interval and pt remains in good behavioral control.  Patient reports feeling stable on this regimen.  Extensive education about Lithium including early and late signs of toxicity, need to get medical attention or go to ED if they experience these s/s, need to maintain good hydration, contraindication of NSAIDs, and need to consult with psychiatrist know before taking any additional prescribed meds.    Patient counseled about caution driving on Zyprexa and other meds as well.  
Chart reviewed.  No overnight events.  Discussed with interdisciplinary team.  Slept 6hrs per patient and sleep log, large improvement over 5 nights of sleeplessness at home.  Is mostly elated on exam with some tearfulness.  States she is not sure what is "a delusion" and what is not.  States she felt full of the Holy Spirit previous.  Reports father wanted her to stop Li because it was causing severe tremor, affective blunting, slowed movements.  Records indicate Li level 0.9 on 600mg qHS.  Patient agreeable to restarting Li at lower dose.  
chart reviewed including pertinent labs. Case discussed with nursing staff. pt pleasant on approach, reports feeling "good" this morning; slept better last night. She believes it was due to her taking medications and having made her bed. She denies feeling anxious. Denies SI. Denies AH. no STAT medications required over this interval and pt remains in good behavioral control.  Continues to feel "happy but not too happy."  Feels good on this lithium dose.  Reports feeling dizzy after taking Seroquel.  We discuss need to titrate further which dizziness may limit.  Agrees with switch to Zyprexa.  Metabolic risks and EPS/TD risks discussed and patient expresses understanding.  
Chart reviewed including pertinent labs. Case discussed with nursing staff. Pleasant on approach, reports feeling "good" this morning; slept well last night. She denies feeling anxious. Wants to work on social anxiety in therapy.  Denies SI. Denies AH. No STAT medications required over this interval and pt remains in good behavioral control.  Patient reports feeling stable on this regimen.  We review IPSRT model of wellness and recovery with bipolar disorder through teachback method including importance of good sleep hygiene, balancing interpersonal and social rhythms to avoid mood episodes, mourning the lost healthy self, and continuing with med compliance.  Lithium safety and toxicity signs reviewed.  
Chart reviewed including pertinent labs. Case discussed with nursing staff. Pleasant on approach, reports feeling "good" this morning; slept well last night. She denies feeling anxious. Denies SI. Denies AH. No STAT medications required over this interval and pt remains in good behavioral control.  States she feels much calmer.  Expresses desire to be treated more like an adult at home.  Describes with family. 
Chart reviewed including pertinent labs. Case discussed with nursing staff. Pleasant on approach, reports feeling "good" this morning; slept well last night. She denies feeling anxious. Denies SI. Denies AH. No STAT medications required over this interval and pt remains in good behavioral control.  States she feels "at times like I want to dance and at times like crying" reflecting her ongoing lability.  Feels good on this lithium dose and denies tremor or other side effects.  Denies Dizziness on Zyprexa.  Worries about increase in appetite.  Metabolic risks and EPS/TD risks discussed and patient expresses understanding.

## 2022-10-12 NOTE — BH INPATIENT PSYCHIATRY DISCHARGE NOTE - NSDCPROCEDURESFT_PSY_ALL_CORE
Historical Values  Lithium Level, Serum: 0.4 mmol/L (10.11.22 @ 08:00)   Lithium Level, Serum: 0.5 mmol/L (10.05.22 @ 08:25)   Lithium Level, Serum: 0.2 mmol/L (09.29.22 @ 18:35)     10-11    140  |  106  |  10  ----------------------------<  79  4.0   |  25  |  0.83    Ca    9.7      11 Oct 2022 08:00

## 2022-10-12 NOTE — BH INPATIENT PSYCHIATRY DISCHARGE NOTE - NSDCMRMEDTOKEN_GEN_ALL_CORE_FT
lithium 450 mg oral tablet, extended release: 1 tab(s) orally once a day (at bedtime)  melatonin 3 mg oral tablet: 1 tab(s) orally once a day (at bedtime), As needed, Insomnia  ZyPREXA 10 mg oral tablet: 1 tab(s) orally once a day (at bedtime)  ZyPREXA 5 mg oral tablet: 1 tab(s) orally once a day (at bedtime), As Needed -insomnia

## 2022-10-12 NOTE — BH INPATIENT PSYCHIATRY PROGRESS NOTE - NSBHCONSULTIPREASON_PSY_A_CORE
other reason

## 2022-10-12 NOTE — BH INPATIENT PSYCHIATRY PROGRESS NOTE - NSBHMSEMUSCLE_PSY_A_CORE
Group Topic: BH Check-in/Symptom Rating    Date: 1/30/2021  Start Time: 0900  End Time: 0930  Facilitators: Dustin Juarez    Focus: Community  Number in attendance: 4    Mood: 5  Goal: Feeling anxious, DC tomorrow. Wants to see a   
Normal muscle tone/strength

## 2022-10-12 NOTE — BH INPATIENT PSYCHIATRY PROGRESS NOTE - NSTXDISORGDATEEST_PSY_ALL_CORE
02-Apr-2021
05-Oct-2022
05-Oct-2022
02-Apr-2021
05-Oct-2022

## 2022-10-12 NOTE — BH INPATIENT PSYCHIATRY PROGRESS NOTE - NSTXPSYCHODATETRGT_PSY_ALL_CORE
07-Oct-2022
12-Oct-2022
07-Oct-2022
07-Oct-2022
12-Oct-2022
13-Oct-2022
07-Oct-2022
12-Oct-2022
13-Oct-2022
07-Oct-2022
13-Oct-2022

## 2022-10-12 NOTE — BH INPATIENT PSYCHIATRY DISCHARGE NOTE - HPI (INCLUDE ILLNESS QUALITY, SEVERITY, DURATION, TIMING, CONTEXT, MODIFYING FACTORS, ASSOCIATED SIGNS AND SYMPTOMS)
23 yo woman, single ,domiciled with family, student at [employer], in care of ETP currently, [facility] following her third psych hospitalization at [facility] from 4/1/21-4/29/21 for a manic episode. Pt has a PPH of Bipolar Disorder vs SAD, hx of two other inpt hospitalizations at [facility] in 2017 and 2019, hx of medication and tx noncompliance, no hx of suicide attempts/NSSIB, no legal hx, no hx of aggression/violence, and no hx of substance use.    [facility]  On presentation, patient is severely thought blocked with significant latency.  She tells RN and I that she has not been sleeping for several days.  Has not been taking prescribed Seroquel because she "does not want to" and lithium was discontinued shortly before that.  Patient denies physical complaints.  Denies chest pain, headache, and all other systems reviewed negative.  She endorses feeling depressed.  States she was having thoughts and visions of the Holy Spirit.  Cannot answer questions about medical history, allergies, and this information is obtained from the chart.      Confirmed the findings of ETP visit today: "Patient was brought in today by her parents, since she has not been sleeping over the last 5 days and started being anxious, with disorganized speech and behavior since yesterday.  On MSE Pt is anxious, with poor eye contact, with speech latency, severe thought blocks with poverty of thought and speech, PMR+, depressed mood with SI and plan, unclear if responding to internal stimuli, poor insight and judgement.  Patient initially requests to be interviewed alone, later asked if her parents could join since she could not express her thoughts. After questioned about her mood reports "I don't like the reality" and that she cannot differentiate with reality and dream. reports "I have delusion about [employer]" but cannot elaborate further. Reports "I am useless" and "a trouble for my parents." Endorses having SI with the plan to "run and got hit by cars" last night. Initially reports that she has not been taking any of her medications for the last week, later states that she might have taken and forgot. Reports that she has not been sleeping over the last 5 nights. Sleep report on her Fitbit hood shows ~1 hour sleep per night over the last 5 nights. Reports poor appetite.      Parents (father: Lucian [phone]) confirm the history stated above. They report that she "hasn't been herself" since yesterday and that yesterday she reported seeing "the holy spirit" in her room. They cannot confirm if she has been taking any of her medications or not. Different treatment options discussed with patient and her parents and they agree with the plan of hospital admission, on voluntary status, for depressive mood and worsened psychosis(disorganized speech, behavior, VH)."    Per outpatient record: "Most recent Rx Plan on 9/22/22: [place] 600mg po at HS discontinued as per patient's request. Seroquel increased to 50mg po at HS. Propranolol 10mg once daily for anxiety."

## 2022-10-19 LAB — LITHIUM SERPL-MCNC: 0.5 MMOL/L — LOW (ref 0.6–1.2)

## 2022-11-16 PROCEDURE — 99214 OFFICE O/P EST MOD 30 MIN: CPT | Mod: 95

## 2022-12-13 PROCEDURE — 99214 OFFICE O/P EST MOD 30 MIN: CPT | Mod: 95

## 2022-12-18 NOTE — BH TREATMENT PLAN - NSTXPSYCHOINTERRN_PSY_ALL_CORE
Assess pt for S&S psychosis and A/V/H, redirect and reorient as necessary, provide support, maintain safety, explore healthy coping skills and identify triggers, administer and educate on ordered medications show

## 2022-12-28 LAB
ALBUMIN SERPL ELPH-MCNC: 4.2 G/DL — SIGNIFICANT CHANGE UP (ref 3.3–5)
ALP SERPL-CCNC: 65 U/L — SIGNIFICANT CHANGE UP (ref 40–120)
ALT FLD-CCNC: 9 U/L — SIGNIFICANT CHANGE UP (ref 4–33)
ANION GAP SERPL CALC-SCNC: 10 MMOL/L — SIGNIFICANT CHANGE UP (ref 7–14)
AST SERPL-CCNC: 15 U/L — SIGNIFICANT CHANGE UP (ref 4–32)
BILIRUB SERPL-MCNC: 0.3 MG/DL — SIGNIFICANT CHANGE UP (ref 0.2–1.2)
BUN SERPL-MCNC: 10 MG/DL — SIGNIFICANT CHANGE UP (ref 7–23)
CALCIUM SERPL-MCNC: 9.8 MG/DL — SIGNIFICANT CHANGE UP (ref 8.4–10.5)
CHLORIDE SERPL-SCNC: 105 MMOL/L — SIGNIFICANT CHANGE UP (ref 98–107)
CO2 SERPL-SCNC: 24 MMOL/L — SIGNIFICANT CHANGE UP (ref 22–31)
CREAT SERPL-MCNC: 0.71 MG/DL — SIGNIFICANT CHANGE UP (ref 0.5–1.3)
EGFR: 124 ML/MIN/1.73M2 — SIGNIFICANT CHANGE UP
GLUCOSE SERPL-MCNC: 111 MG/DL — HIGH (ref 70–99)
HCT VFR BLD CALC: 40 % — SIGNIFICANT CHANGE UP (ref 34.5–45)
HGB BLD-MCNC: 12.7 G/DL — SIGNIFICANT CHANGE UP (ref 11.5–15.5)
LITHIUM SERPL-MCNC: 0.6 MMOL/L — SIGNIFICANT CHANGE UP (ref 0.6–1.2)
MCHC RBC-ENTMCNC: 28.1 PG — SIGNIFICANT CHANGE UP (ref 27–34)
MCHC RBC-ENTMCNC: 31.8 GM/DL — LOW (ref 32–36)
MCV RBC AUTO: 88.5 FL — SIGNIFICANT CHANGE UP (ref 80–100)
NRBC # BLD: 0 /100 WBCS — SIGNIFICANT CHANGE UP (ref 0–0)
NRBC # FLD: 0 K/UL — SIGNIFICANT CHANGE UP (ref 0–0)
PLATELET # BLD AUTO: 345 K/UL — SIGNIFICANT CHANGE UP (ref 150–400)
POTASSIUM SERPL-MCNC: 3.6 MMOL/L — SIGNIFICANT CHANGE UP (ref 3.5–5.3)
POTASSIUM SERPL-SCNC: 3.6 MMOL/L — SIGNIFICANT CHANGE UP (ref 3.5–5.3)
PROT SERPL-MCNC: 7.3 G/DL — SIGNIFICANT CHANGE UP (ref 6–8.3)
RBC # BLD: 4.52 M/UL — SIGNIFICANT CHANGE UP (ref 3.8–5.2)
RBC # FLD: 12.7 % — SIGNIFICANT CHANGE UP (ref 10.3–14.5)
SODIUM SERPL-SCNC: 139 MMOL/L — SIGNIFICANT CHANGE UP (ref 135–145)
T4 FREE SERPL-MCNC: 1 NG/DL — SIGNIFICANT CHANGE UP (ref 0.9–1.8)
WBC # BLD: 7.31 K/UL — SIGNIFICANT CHANGE UP (ref 3.8–10.5)
WBC # FLD AUTO: 7.31 K/UL — SIGNIFICANT CHANGE UP (ref 3.8–10.5)

## 2022-12-28 PROCEDURE — 99214 OFFICE O/P EST MOD 30 MIN: CPT | Mod: 95

## 2023-01-17 NOTE — BH DISCHARGE NOTE NURSING/SOCIAL WORK/PSYCH REHAB - CAREGIVER PHONE NUMBER
Sheila Mendelson-Proxy (ANSHUL) Nancy Cruz [phone]/Health Care Proxy (HCP)/Living Will [phone] Sheila Mendelson-Proxy (ANSHUL) Nancy Cruz [phone] (emergency contact)/Health Care Proxy (HCP)/Living Will

## 2025-04-30 PROCEDURE — 99214 OFFICE O/P EST MOD 30 MIN: CPT | Mod: 95

## 2025-07-09 ENCOUNTER — TRANSCRIPTION ENCOUNTER (OUTPATIENT)
Age: 25
End: 2025-07-09

## 2025-07-09 ENCOUNTER — APPOINTMENT (OUTPATIENT)
Dept: OBGYN | Facility: CLINIC | Age: 25
End: 2025-07-09

## 2025-07-09 VITALS
TEMPERATURE: 98 F | OXYGEN SATURATION: 98 % | RESPIRATION RATE: 16 BRPM | BODY MASS INDEX: 23.04 KG/M2 | WEIGHT: 130 LBS | SYSTOLIC BLOOD PRESSURE: 110 MMHG | HEART RATE: 105 BPM | DIASTOLIC BLOOD PRESSURE: 78 MMHG | HEIGHT: 63 IN

## 2025-07-09 PROCEDURE — ZZZZZ: CPT
